# Patient Record
Sex: FEMALE | Race: WHITE | NOT HISPANIC OR LATINO | Employment: FULL TIME | URBAN - METROPOLITAN AREA
[De-identification: names, ages, dates, MRNs, and addresses within clinical notes are randomized per-mention and may not be internally consistent; named-entity substitution may affect disease eponyms.]

---
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
## 2021-03-26 ENCOUNTER — HOSPITAL ENCOUNTER (EMERGENCY)
Facility: HOSPITAL | Age: 21
Discharge: HOME | End: 2021-03-26
Attending: EMERGENCY MEDICINE
Payer: COMMERCIAL

## 2021-03-26 VITALS
HEART RATE: 72 BPM | DIASTOLIC BLOOD PRESSURE: 75 MMHG | BODY MASS INDEX: 44.41 KG/M2 | OXYGEN SATURATION: 99 % | SYSTOLIC BLOOD PRESSURE: 152 MMHG | WEIGHT: 293 LBS | RESPIRATION RATE: 18 BRPM | TEMPERATURE: 98 F | HEIGHT: 68 IN

## 2021-03-26 DIAGNOSIS — M25.569 KNEE PAIN: Primary | ICD-10-CM

## 2021-03-26 LAB
B-HCG UR QL: NEGATIVE
CTP QC/QA: YES

## 2021-03-26 PROCEDURE — 96372 THER/PROPH/DIAG INJ SC/IM: CPT

## 2021-03-26 PROCEDURE — 99284 EMERGENCY DEPT VISIT MOD MDM: CPT | Mod: 25

## 2021-03-26 PROCEDURE — 63600175 PHARM REV CODE 636 W HCPCS: Performed by: PHYSICIAN ASSISTANT

## 2021-03-26 PROCEDURE — 81025 URINE PREGNANCY TEST: CPT | Performed by: PHYSICIAN ASSISTANT

## 2021-03-26 PROCEDURE — 25000003 PHARM REV CODE 250: Performed by: PHYSICIAN ASSISTANT

## 2021-03-26 RX ORDER — DEXAMETHASONE SODIUM PHOSPHATE 4 MG/ML
8 INJECTION, SOLUTION INTRA-ARTICULAR; INTRALESIONAL; INTRAMUSCULAR; INTRAVENOUS; SOFT TISSUE
Status: COMPLETED | OUTPATIENT
Start: 2021-03-26 | End: 2021-03-26

## 2021-03-26 RX ORDER — KETOROLAC TROMETHAMINE 10 MG/1
10 TABLET, FILM COATED ORAL
Status: COMPLETED | OUTPATIENT
Start: 2021-03-26 | End: 2021-03-26

## 2021-03-26 RX ORDER — NAPROXEN 500 MG/1
500 TABLET ORAL 2 TIMES DAILY WITH MEALS
Qty: 12 TABLET | Refills: 0 | Status: SHIPPED | OUTPATIENT
Start: 2021-03-26 | End: 2021-05-28

## 2021-03-26 RX ADMIN — KETOROLAC TROMETHAMINE 10 MG: 10 TABLET, FILM COATED ORAL at 05:03

## 2021-03-26 RX ADMIN — DEXAMETHASONE SODIUM PHOSPHATE 8 MG: 4 INJECTION, SOLUTION INTRA-ARTICULAR; INTRALESIONAL; INTRAMUSCULAR; INTRAVENOUS; SOFT TISSUE at 05:03

## 2021-05-16 ENCOUNTER — HOSPITAL ENCOUNTER (EMERGENCY)
Facility: HOSPITAL | Age: 21
Discharge: HOME | End: 2021-05-16
Attending: EMERGENCY MEDICINE
Payer: COMMERCIAL

## 2021-05-16 VITALS
OXYGEN SATURATION: 100 % | HEART RATE: 102 BPM | RESPIRATION RATE: 18 BRPM | TEMPERATURE: 98 F | SYSTOLIC BLOOD PRESSURE: 147 MMHG | WEIGHT: 293 LBS | BODY MASS INDEX: 44.41 KG/M2 | HEIGHT: 68 IN | DIASTOLIC BLOOD PRESSURE: 71 MMHG

## 2021-05-16 DIAGNOSIS — R10.9 ABDOMINAL PAIN, UNSPECIFIED ABDOMINAL LOCATION: Primary | ICD-10-CM

## 2021-05-16 LAB
B-HCG UR QL: NEGATIVE
CTP QC/QA: YES

## 2021-05-16 PROCEDURE — 99282 PR EMERGENCY DEPT VISIT,LEVEL II: ICD-10-PCS | Mod: ,,, | Performed by: EMERGENCY MEDICINE

## 2021-05-16 PROCEDURE — 81025 URINE PREGNANCY TEST: CPT | Performed by: EMERGENCY MEDICINE

## 2021-05-16 PROCEDURE — 99282 EMERGENCY DEPT VISIT SF MDM: CPT

## 2021-05-16 PROCEDURE — 99282 EMERGENCY DEPT VISIT SF MDM: CPT | Mod: ,,, | Performed by: EMERGENCY MEDICINE

## 2021-05-27 PROBLEM — R41.89 IMPAIRED COGNITION: Status: ACTIVE | Noted: 2021-05-27

## 2021-05-27 PROBLEM — F41.9 ANXIETY DISORDER: Status: ACTIVE | Noted: 2021-05-27

## 2021-05-27 PROBLEM — F32.A DEPRESSIVE DISORDER: Status: ACTIVE | Noted: 2021-05-27

## 2021-05-27 RX ORDER — BUPROPION HYDROCHLORIDE 150 MG/1
150 TABLET, EXTENDED RELEASE ORAL DAILY
COMMUNITY
Start: 2020-12-04 | End: 2021-05-28

## 2021-05-28 ENCOUNTER — OFFICE VISIT (OUTPATIENT)
Dept: INTERNAL MEDICINE | Facility: CLINIC | Age: 21
End: 2021-05-28
Payer: COMMERCIAL

## 2021-05-28 VITALS
BODY MASS INDEX: 44.41 KG/M2 | HEIGHT: 68 IN | SYSTOLIC BLOOD PRESSURE: 142 MMHG | HEART RATE: 78 BPM | WEIGHT: 293 LBS | OXYGEN SATURATION: 98 % | DIASTOLIC BLOOD PRESSURE: 86 MMHG

## 2021-05-28 DIAGNOSIS — F25.9 SCHIZOAFFECTIVE DISORDER, UNSPECIFIED TYPE: ICD-10-CM

## 2021-05-28 DIAGNOSIS — F41.9 ANXIETY DISORDER, UNSPECIFIED TYPE: ICD-10-CM

## 2021-05-28 DIAGNOSIS — E28.2 PCOS (POLYCYSTIC OVARIAN SYNDROME): ICD-10-CM

## 2021-05-28 DIAGNOSIS — Z13.220 SCREENING CHOLESTEROL LEVEL: ICD-10-CM

## 2021-05-28 DIAGNOSIS — Z76.89 ENCOUNTER TO ESTABLISH CARE WITH NEW DOCTOR: ICD-10-CM

## 2021-05-28 DIAGNOSIS — Z01.89 ROUTINE LAB DRAW: ICD-10-CM

## 2021-05-28 DIAGNOSIS — Z11.59 ENCOUNTER FOR HEPATITIS C SCREENING TEST FOR LOW RISK PATIENT: ICD-10-CM

## 2021-05-28 DIAGNOSIS — Z00.00 ENCOUNTER FOR HEALTH MAINTENANCE EXAMINATION: Primary | ICD-10-CM

## 2021-05-28 DIAGNOSIS — F32.A DEPRESSIVE DISORDER: ICD-10-CM

## 2021-05-28 DIAGNOSIS — E66.01 MORBIDLY OBESE: ICD-10-CM

## 2021-05-28 DIAGNOSIS — Z11.4 ENCOUNTER FOR SCREENING FOR HIV: ICD-10-CM

## 2021-05-28 PROCEDURE — 1126F AMNT PAIN NOTED NONE PRSNT: CPT | Mod: S$GLB,,, | Performed by: NURSE PRACTITIONER

## 2021-05-28 PROCEDURE — 3008F PR BODY MASS INDEX (BMI) DOCUMENTED: ICD-10-PCS | Mod: CPTII,S$GLB,, | Performed by: NURSE PRACTITIONER

## 2021-05-28 PROCEDURE — 3008F BODY MASS INDEX DOCD: CPT | Mod: CPTII,S$GLB,, | Performed by: NURSE PRACTITIONER

## 2021-05-28 PROCEDURE — 99999 PR PBB SHADOW E&M-EST. PATIENT-LVL IV: ICD-10-PCS | Mod: PBBFAC,,, | Performed by: NURSE PRACTITIONER

## 2021-05-28 PROCEDURE — 99999 PR PBB SHADOW E&M-EST. PATIENT-LVL IV: CPT | Mod: PBBFAC,,, | Performed by: NURSE PRACTITIONER

## 2021-05-28 PROCEDURE — 99385 PR PREVENTIVE VISIT,NEW,18-39: ICD-10-PCS | Mod: S$GLB,,, | Performed by: NURSE PRACTITIONER

## 2021-05-28 PROCEDURE — 1126F PR PAIN SEVERITY QUANTIFIED, NO PAIN PRESENT: ICD-10-PCS | Mod: S$GLB,,, | Performed by: NURSE PRACTITIONER

## 2021-05-28 PROCEDURE — 99385 PREV VISIT NEW AGE 18-39: CPT | Mod: S$GLB,,, | Performed by: NURSE PRACTITIONER

## 2021-05-29 ENCOUNTER — LAB VISIT (OUTPATIENT)
Dept: LAB | Facility: HOSPITAL | Age: 21
End: 2021-05-29
Payer: COMMERCIAL

## 2021-05-29 DIAGNOSIS — Z11.4 ENCOUNTER FOR SCREENING FOR HIV: ICD-10-CM

## 2021-05-29 DIAGNOSIS — Z11.59 ENCOUNTER FOR HEPATITIS C SCREENING TEST FOR LOW RISK PATIENT: ICD-10-CM

## 2021-05-29 DIAGNOSIS — Z01.89 ROUTINE LAB DRAW: ICD-10-CM

## 2021-05-29 DIAGNOSIS — Z13.220 SCREENING CHOLESTEROL LEVEL: ICD-10-CM

## 2021-05-29 LAB
ALBUMIN SERPL BCP-MCNC: 3.8 G/DL (ref 3.5–5.2)
ALP SERPL-CCNC: 65 U/L (ref 55–135)
ALT SERPL W/O P-5'-P-CCNC: 22 U/L (ref 10–44)
ANION GAP SERPL CALC-SCNC: 9 MMOL/L (ref 8–16)
AST SERPL-CCNC: 15 U/L (ref 10–40)
BASOPHILS # BLD AUTO: 0.04 K/UL (ref 0–0.2)
BASOPHILS NFR BLD: 0.6 % (ref 0–1.9)
BILIRUB SERPL-MCNC: 0.7 MG/DL (ref 0.1–1)
BUN SERPL-MCNC: 9 MG/DL (ref 6–20)
CALCIUM SERPL-MCNC: 9.6 MG/DL (ref 8.7–10.5)
CHLORIDE SERPL-SCNC: 106 MMOL/L (ref 95–110)
CHOLEST SERPL-MCNC: 165 MG/DL (ref 120–199)
CHOLEST/HDLC SERPL: 4.3 {RATIO} (ref 2–5)
CO2 SERPL-SCNC: 25 MMOL/L (ref 23–29)
CREAT SERPL-MCNC: 0.8 MG/DL (ref 0.5–1.4)
DIFFERENTIAL METHOD: ABNORMAL
EOSINOPHIL # BLD AUTO: 0.1 K/UL (ref 0–0.5)
EOSINOPHIL NFR BLD: 1 % (ref 0–8)
ERYTHROCYTE [DISTWIDTH] IN BLOOD BY AUTOMATED COUNT: 13.9 % (ref 11.5–14.5)
EST. GFR  (AFRICAN AMERICAN): >60 ML/MIN/1.73 M^2
EST. GFR  (NON AFRICAN AMERICAN): >60 ML/MIN/1.73 M^2
GLUCOSE SERPL-MCNC: 86 MG/DL (ref 70–110)
HCT VFR BLD AUTO: 40.2 % (ref 37–48.5)
HDLC SERPL-MCNC: 38 MG/DL (ref 40–75)
HDLC SERPL: 23 % (ref 20–50)
HGB BLD-MCNC: 12.4 G/DL (ref 12–16)
IMM GRANULOCYTES # BLD AUTO: 0.02 K/UL (ref 0–0.04)
IMM GRANULOCYTES NFR BLD AUTO: 0.3 % (ref 0–0.5)
LDLC SERPL CALC-MCNC: 104.4 MG/DL (ref 63–159)
LYMPHOCYTES # BLD AUTO: 2.3 K/UL (ref 1–4.8)
LYMPHOCYTES NFR BLD: 36.8 % (ref 18–48)
MCH RBC QN AUTO: 26.2 PG (ref 27–31)
MCHC RBC AUTO-ENTMCNC: 30.8 G/DL (ref 32–36)
MCV RBC AUTO: 85 FL (ref 82–98)
MONOCYTES # BLD AUTO: 0.4 K/UL (ref 0.3–1)
MONOCYTES NFR BLD: 5.9 % (ref 4–15)
NEUTROPHILS # BLD AUTO: 3.5 K/UL (ref 1.8–7.7)
NEUTROPHILS NFR BLD: 55.4 % (ref 38–73)
NONHDLC SERPL-MCNC: 127 MG/DL
NRBC BLD-RTO: 0 /100 WBC
PLATELET # BLD AUTO: 378 K/UL (ref 150–450)
PMV BLD AUTO: 10.7 FL (ref 9.2–12.9)
POTASSIUM SERPL-SCNC: 3.8 MMOL/L (ref 3.5–5.1)
PROT SERPL-MCNC: 7.1 G/DL (ref 6–8.4)
RBC # BLD AUTO: 4.73 M/UL (ref 4–5.4)
SODIUM SERPL-SCNC: 140 MMOL/L (ref 136–145)
TRIGL SERPL-MCNC: 113 MG/DL (ref 30–150)
WBC # BLD AUTO: 6.27 K/UL (ref 3.9–12.7)

## 2021-05-29 PROCEDURE — 86703 HIV-1/HIV-2 1 RESULT ANTBDY: CPT | Performed by: NURSE PRACTITIONER

## 2021-05-29 PROCEDURE — 80053 COMPREHEN METABOLIC PANEL: CPT | Performed by: NURSE PRACTITIONER

## 2021-05-29 PROCEDURE — 85025 COMPLETE CBC W/AUTO DIFF WBC: CPT | Performed by: NURSE PRACTITIONER

## 2021-05-29 PROCEDURE — 80061 LIPID PANEL: CPT | Performed by: NURSE PRACTITIONER

## 2021-05-29 PROCEDURE — 86803 HEPATITIS C AB TEST: CPT | Performed by: NURSE PRACTITIONER

## 2021-05-29 PROCEDURE — 36415 COLL VENOUS BLD VENIPUNCTURE: CPT | Performed by: NURSE PRACTITIONER

## 2021-05-31 LAB
HCV AB SERPL QL IA: NEGATIVE
HIV 1+2 AB+HIV1 P24 AG SERPL QL IA: NEGATIVE

## 2021-06-01 ENCOUNTER — TELEPHONE (OUTPATIENT)
Dept: INTERNAL MEDICINE | Facility: CLINIC | Age: 21
End: 2021-06-01

## 2021-07-13 ENCOUNTER — TELEPHONE (OUTPATIENT)
Dept: INTERNAL MEDICINE | Facility: CLINIC | Age: 21
End: 2021-07-13

## 2021-07-16 ENCOUNTER — OFFICE VISIT (OUTPATIENT)
Dept: INTERNAL MEDICINE | Facility: CLINIC | Age: 21
End: 2021-07-16
Payer: COMMERCIAL

## 2021-07-16 ENCOUNTER — LAB VISIT (OUTPATIENT)
Dept: LAB | Facility: HOSPITAL | Age: 21
End: 2021-07-16
Payer: COMMERCIAL

## 2021-07-16 VITALS
BODY MASS INDEX: 44.41 KG/M2 | DIASTOLIC BLOOD PRESSURE: 86 MMHG | SYSTOLIC BLOOD PRESSURE: 132 MMHG | HEART RATE: 85 BPM | WEIGHT: 293 LBS | HEIGHT: 68 IN

## 2021-07-16 DIAGNOSIS — Z11.3 SCREEN FOR STD (SEXUALLY TRANSMITTED DISEASE): ICD-10-CM

## 2021-07-16 DIAGNOSIS — R21 RASH IN ADULT: Primary | ICD-10-CM

## 2021-07-16 DIAGNOSIS — E66.01 MORBIDLY OBESE: ICD-10-CM

## 2021-07-16 PROCEDURE — 1126F PR PAIN SEVERITY QUANTIFIED, NO PAIN PRESENT: ICD-10-PCS | Mod: S$GLB,,, | Performed by: NURSE PRACTITIONER

## 2021-07-16 PROCEDURE — 36415 COLL VENOUS BLD VENIPUNCTURE: CPT | Performed by: NURSE PRACTITIONER

## 2021-07-16 PROCEDURE — 80074 ACUTE HEPATITIS PANEL: CPT | Performed by: NURSE PRACTITIONER

## 2021-07-16 PROCEDURE — 3008F PR BODY MASS INDEX (BMI) DOCUMENTED: ICD-10-PCS | Mod: CPTII,S$GLB,, | Performed by: NURSE PRACTITIONER

## 2021-07-16 PROCEDURE — 99999 PR PBB SHADOW E&M-EST. PATIENT-LVL III: CPT | Mod: PBBFAC,,, | Performed by: NURSE PRACTITIONER

## 2021-07-16 PROCEDURE — 99214 OFFICE O/P EST MOD 30 MIN: CPT | Mod: S$GLB,,, | Performed by: NURSE PRACTITIONER

## 2021-07-16 PROCEDURE — 1126F AMNT PAIN NOTED NONE PRSNT: CPT | Mod: S$GLB,,, | Performed by: NURSE PRACTITIONER

## 2021-07-16 PROCEDURE — 99214 PR OFFICE/OUTPT VISIT, EST, LEVL IV, 30-39 MIN: ICD-10-PCS | Mod: S$GLB,,, | Performed by: NURSE PRACTITIONER

## 2021-07-16 PROCEDURE — 3008F BODY MASS INDEX DOCD: CPT | Mod: CPTII,S$GLB,, | Performed by: NURSE PRACTITIONER

## 2021-07-16 PROCEDURE — 99999 PR PBB SHADOW E&M-EST. PATIENT-LVL III: ICD-10-PCS | Mod: PBBFAC,,, | Performed by: NURSE PRACTITIONER

## 2021-07-16 RX ORDER — NAPROXEN 500 MG/1
TABLET ORAL
COMMUNITY
Start: 2021-06-24 | End: 2022-03-02

## 2021-07-19 ENCOUNTER — TELEPHONE (OUTPATIENT)
Dept: GASTROENTEROLOGY | Facility: CLINIC | Age: 21
End: 2021-07-19

## 2021-07-19 LAB
HAV IGM SERPL QL IA: NEGATIVE
HBV CORE IGM SERPL QL IA: NEGATIVE
HBV SURFACE AG SERPL QL IA: NEGATIVE
HCV AB SERPL QL IA: NEGATIVE
HCV AB SERPL QL IA: NEGATIVE

## 2021-07-20 ENCOUNTER — PATIENT MESSAGE (OUTPATIENT)
Dept: ORTHOPEDICS | Facility: CLINIC | Age: 21
End: 2021-07-20

## 2021-07-26 ENCOUNTER — HOSPITAL ENCOUNTER (OUTPATIENT)
Dept: RADIOLOGY | Facility: HOSPITAL | Age: 21
Discharge: HOME | End: 2021-07-26
Attending: ORTHOPAEDIC SURGERY
Payer: COMMERCIAL

## 2021-07-26 ENCOUNTER — OFFICE VISIT (OUTPATIENT)
Dept: SPORTS MEDICINE | Facility: CLINIC | Age: 21
End: 2021-07-26
Payer: COMMERCIAL

## 2021-07-26 VITALS — HEIGHT: 68 IN | WEIGHT: 293 LBS | BODY MASS INDEX: 44.41 KG/M2

## 2021-07-26 DIAGNOSIS — M25.569 KNEE PAIN: ICD-10-CM

## 2021-07-26 DIAGNOSIS — M25.562 CHRONIC PAIN OF LEFT KNEE: Primary | ICD-10-CM

## 2021-07-26 DIAGNOSIS — G89.29 CHRONIC PAIN OF LEFT KNEE: Primary | ICD-10-CM

## 2021-07-26 PROCEDURE — 99203 OFFICE O/P NEW LOW 30 MIN: CPT | Mod: S$GLB,,, | Performed by: ORTHOPAEDIC SURGERY

## 2021-07-26 PROCEDURE — 99999 PR PBB SHADOW E&M-EST. PATIENT-LVL III: ICD-10-PCS | Mod: PBBFAC,,, | Performed by: ORTHOPAEDIC SURGERY

## 2021-07-26 PROCEDURE — 73564 X-RAY EXAM KNEE 4 OR MORE: CPT | Mod: 26,,, | Performed by: RADIOLOGY

## 2021-07-26 PROCEDURE — 3008F BODY MASS INDEX DOCD: CPT | Mod: CPTII,S$GLB,, | Performed by: ORTHOPAEDIC SURGERY

## 2021-07-26 PROCEDURE — 1160F RVW MEDS BY RX/DR IN RCRD: CPT | Mod: CPTII,S$GLB,, | Performed by: ORTHOPAEDIC SURGERY

## 2021-07-26 PROCEDURE — 1159F MED LIST DOCD IN RCRD: CPT | Mod: CPTII,S$GLB,, | Performed by: ORTHOPAEDIC SURGERY

## 2021-07-26 PROCEDURE — 1125F PR PAIN SEVERITY QUANTIFIED, PAIN PRESENT: ICD-10-PCS | Mod: CPTII,S$GLB,, | Performed by: ORTHOPAEDIC SURGERY

## 2021-07-26 PROCEDURE — 1159F PR MEDICATION LIST DOCUMENTED IN MEDICAL RECORD: ICD-10-PCS | Mod: CPTII,S$GLB,, | Performed by: ORTHOPAEDIC SURGERY

## 2021-07-26 PROCEDURE — 73564 XR KNEE ORTHO BILAT WITH FLEXION: ICD-10-PCS | Mod: 26,,, | Performed by: RADIOLOGY

## 2021-07-26 PROCEDURE — 1160F PR REVIEW ALL MEDS BY PRESCRIBER/CLIN PHARMACIST DOCUMENTED: ICD-10-PCS | Mod: CPTII,S$GLB,, | Performed by: ORTHOPAEDIC SURGERY

## 2021-07-26 PROCEDURE — 3008F PR BODY MASS INDEX (BMI) DOCUMENTED: ICD-10-PCS | Mod: CPTII,S$GLB,, | Performed by: ORTHOPAEDIC SURGERY

## 2021-07-26 PROCEDURE — 1125F AMNT PAIN NOTED PAIN PRSNT: CPT | Mod: CPTII,S$GLB,, | Performed by: ORTHOPAEDIC SURGERY

## 2021-07-26 PROCEDURE — 99999 PR PBB SHADOW E&M-EST. PATIENT-LVL III: CPT | Mod: PBBFAC,,, | Performed by: ORTHOPAEDIC SURGERY

## 2021-07-26 PROCEDURE — 73564 X-RAY EXAM KNEE 4 OR MORE: CPT | Mod: TC,50

## 2021-07-26 PROCEDURE — 99203 PR OFFICE/OUTPT VISIT, NEW, LEVL III, 30-44 MIN: ICD-10-PCS | Mod: S$GLB,,, | Performed by: ORTHOPAEDIC SURGERY

## 2021-07-28 ENCOUNTER — CLINICAL SUPPORT (OUTPATIENT)
Dept: REHABILITATION | Facility: HOSPITAL | Age: 21
End: 2021-07-28
Payer: COMMERCIAL

## 2021-07-28 DIAGNOSIS — M25.562 CHRONIC PAIN OF LEFT KNEE: ICD-10-CM

## 2021-07-28 DIAGNOSIS — G89.29 CHRONIC PAIN OF LEFT KNEE: ICD-10-CM

## 2021-07-28 PROCEDURE — 97110 THERAPEUTIC EXERCISES: CPT

## 2021-07-28 PROCEDURE — 97161 PT EVAL LOW COMPLEX 20 MIN: CPT

## 2021-08-12 ENCOUNTER — OFFICE VISIT (OUTPATIENT)
Dept: OBSTETRICS AND GYNECOLOGY | Facility: CLINIC | Age: 21
End: 2021-08-12
Payer: COMMERCIAL

## 2021-08-12 VITALS — HEIGHT: 68 IN | BODY MASS INDEX: 44.41 KG/M2 | WEIGHT: 293 LBS

## 2021-08-12 DIAGNOSIS — E28.2 PCOS (POLYCYSTIC OVARIAN SYNDROME): ICD-10-CM

## 2021-08-12 DIAGNOSIS — N91.2 AMENORRHEA: ICD-10-CM

## 2021-08-12 LAB
B-HCG UR QL: NEGATIVE
CTP QC/QA: YES

## 2021-08-12 PROCEDURE — 81025 POCT URINE PREGNANCY: ICD-10-PCS | Mod: S$GLB,,, | Performed by: OBSTETRICS & GYNECOLOGY

## 2021-08-12 PROCEDURE — 99999 PR PBB SHADOW E&M-EST. PATIENT-LVL III: CPT | Mod: PBBFAC,,, | Performed by: OBSTETRICS & GYNECOLOGY

## 2021-08-12 PROCEDURE — 1159F PR MEDICATION LIST DOCUMENTED IN MEDICAL RECORD: ICD-10-PCS | Mod: CPTII,S$GLB,, | Performed by: OBSTETRICS & GYNECOLOGY

## 2021-08-12 PROCEDURE — 3008F BODY MASS INDEX DOCD: CPT | Mod: CPTII,S$GLB,, | Performed by: OBSTETRICS & GYNECOLOGY

## 2021-08-12 PROCEDURE — 1160F PR REVIEW ALL MEDS BY PRESCRIBER/CLIN PHARMACIST DOCUMENTED: ICD-10-PCS | Mod: CPTII,S$GLB,, | Performed by: OBSTETRICS & GYNECOLOGY

## 2021-08-12 PROCEDURE — 1125F PR PAIN SEVERITY QUANTIFIED, PAIN PRESENT: ICD-10-PCS | Mod: CPTII,S$GLB,, | Performed by: OBSTETRICS & GYNECOLOGY

## 2021-08-12 PROCEDURE — 99203 PR OFFICE/OUTPT VISIT, NEW, LEVL III, 30-44 MIN: ICD-10-PCS | Mod: 25,S$GLB,, | Performed by: OBSTETRICS & GYNECOLOGY

## 2021-08-12 PROCEDURE — 99999 PR PBB SHADOW E&M-EST. PATIENT-LVL III: ICD-10-PCS | Mod: PBBFAC,,, | Performed by: OBSTETRICS & GYNECOLOGY

## 2021-08-12 PROCEDURE — 81025 URINE PREGNANCY TEST: CPT | Mod: S$GLB,,, | Performed by: OBSTETRICS & GYNECOLOGY

## 2021-08-12 PROCEDURE — 1160F RVW MEDS BY RX/DR IN RCRD: CPT | Mod: CPTII,S$GLB,, | Performed by: OBSTETRICS & GYNECOLOGY

## 2021-08-12 PROCEDURE — 1159F MED LIST DOCD IN RCRD: CPT | Mod: CPTII,S$GLB,, | Performed by: OBSTETRICS & GYNECOLOGY

## 2021-08-12 PROCEDURE — 99203 OFFICE O/P NEW LOW 30 MIN: CPT | Mod: 25,S$GLB,, | Performed by: OBSTETRICS & GYNECOLOGY

## 2021-08-12 PROCEDURE — 1125F AMNT PAIN NOTED PAIN PRSNT: CPT | Mod: CPTII,S$GLB,, | Performed by: OBSTETRICS & GYNECOLOGY

## 2021-08-12 PROCEDURE — 3008F PR BODY MASS INDEX (BMI) DOCUMENTED: ICD-10-PCS | Mod: CPTII,S$GLB,, | Performed by: OBSTETRICS & GYNECOLOGY

## 2021-08-12 RX ORDER — MEDROXYPROGESTERONE ACETATE 10 MG/1
10 TABLET ORAL DAILY
Qty: 14 TABLET | Refills: 11 | Status: SHIPPED | OUTPATIENT
Start: 2021-08-12 | End: 2021-11-24

## 2021-08-18 ENCOUNTER — TELEPHONE (OUTPATIENT)
Dept: ADMINISTRATIVE | Facility: OTHER | Age: 21
End: 2021-08-18

## 2021-10-21 ENCOUNTER — HOSPITAL ENCOUNTER (EMERGENCY)
Facility: HOSPITAL | Age: 21
Discharge: HOME | End: 2021-10-21
Attending: EMERGENCY MEDICINE
Payer: COMMERCIAL

## 2021-10-21 ENCOUNTER — PATIENT OUTREACH (OUTPATIENT)
Dept: EMERGENCY MEDICINE | Facility: HOSPITAL | Age: 21
End: 2021-10-21

## 2021-10-21 VITALS
SYSTOLIC BLOOD PRESSURE: 130 MMHG | WEIGHT: 293 LBS | DIASTOLIC BLOOD PRESSURE: 68 MMHG | HEART RATE: 65 BPM | BODY MASS INDEX: 44.41 KG/M2 | TEMPERATURE: 98 F | RESPIRATION RATE: 16 BRPM | OXYGEN SATURATION: 100 % | HEIGHT: 68 IN

## 2021-10-21 DIAGNOSIS — R11.0 NAUSEA: Primary | ICD-10-CM

## 2021-10-21 LAB
ALBUMIN SERPL BCP-MCNC: 4.2 G/DL (ref 3.5–5.2)
ALP SERPL-CCNC: 68 U/L (ref 55–135)
ALT SERPL W/O P-5'-P-CCNC: 32 U/L (ref 10–44)
ANION GAP SERPL CALC-SCNC: 12 MMOL/L (ref 8–16)
AST SERPL-CCNC: 20 U/L (ref 10–40)
B-HCG UR QL: NEGATIVE
BASOPHILS # BLD AUTO: 0.05 K/UL (ref 0–0.2)
BASOPHILS NFR BLD: 0.7 % (ref 0–1.9)
BILIRUB SERPL-MCNC: 0.6 MG/DL (ref 0.1–1)
BILIRUB UR QL STRIP: NEGATIVE
BUN SERPL-MCNC: 9 MG/DL (ref 6–20)
CALCIUM SERPL-MCNC: 10.1 MG/DL (ref 8.7–10.5)
CHLORIDE SERPL-SCNC: 108 MMOL/L (ref 95–110)
CLARITY UR REFRACT.AUTO: ABNORMAL
CO2 SERPL-SCNC: 22 MMOL/L (ref 23–29)
COLOR UR AUTO: YELLOW
CREAT SERPL-MCNC: 0.7 MG/DL (ref 0.5–1.4)
CTP QC/QA: YES
DIFFERENTIAL METHOD: ABNORMAL
EOSINOPHIL # BLD AUTO: 0 K/UL (ref 0–0.5)
EOSINOPHIL NFR BLD: 0.4 % (ref 0–8)
ERYTHROCYTE [DISTWIDTH] IN BLOOD BY AUTOMATED COUNT: 13.7 % (ref 11.5–14.5)
EST. GFR  (AFRICAN AMERICAN): >60 ML/MIN/1.73 M^2
EST. GFR  (NON AFRICAN AMERICAN): >60 ML/MIN/1.73 M^2
GLUCOSE SERPL-MCNC: 78 MG/DL (ref 70–110)
GLUCOSE UR QL STRIP: NEGATIVE
HCT VFR BLD AUTO: 44 % (ref 37–48.5)
HGB BLD-MCNC: 13.6 G/DL (ref 12–16)
HGB UR QL STRIP: NEGATIVE
IMM GRANULOCYTES # BLD AUTO: 0.01 K/UL (ref 0–0.04)
IMM GRANULOCYTES NFR BLD AUTO: 0.1 % (ref 0–0.5)
KETONES UR QL STRIP: NEGATIVE
LEUKOCYTE ESTERASE UR QL STRIP: ABNORMAL
LYMPHOCYTES # BLD AUTO: 2.5 K/UL (ref 1–4.8)
LYMPHOCYTES NFR BLD: 33.8 % (ref 18–48)
MCH RBC QN AUTO: 27.2 PG (ref 27–31)
MCHC RBC AUTO-ENTMCNC: 30.9 G/DL (ref 32–36)
MCV RBC AUTO: 88 FL (ref 82–98)
MICROSCOPIC COMMENT: NORMAL
MONOCYTES # BLD AUTO: 0.4 K/UL (ref 0.3–1)
MONOCYTES NFR BLD: 5 % (ref 4–15)
NEUTROPHILS # BLD AUTO: 4.4 K/UL (ref 1.8–7.7)
NEUTROPHILS NFR BLD: 60 % (ref 38–73)
NITRITE UR QL STRIP: NEGATIVE
NRBC BLD-RTO: 0 /100 WBC
PH UR STRIP: 6 [PH] (ref 5–8)
PLATELET # BLD AUTO: 372 K/UL (ref 150–450)
PMV BLD AUTO: 10.5 FL (ref 9.2–12.9)
POTASSIUM SERPL-SCNC: 3.9 MMOL/L (ref 3.5–5.1)
PROT SERPL-MCNC: 7.8 G/DL (ref 6–8.4)
PROT UR QL STRIP: NEGATIVE
RBC # BLD AUTO: 5 M/UL (ref 4–5.4)
SODIUM SERPL-SCNC: 142 MMOL/L (ref 136–145)
SP GR UR STRIP: 1.03 (ref 1–1.03)
SQUAMOUS #/AREA URNS AUTO: 7 /HPF
URN SPEC COLLECT METH UR: ABNORMAL
WBC # BLD AUTO: 7.36 K/UL (ref 3.9–12.7)
WBC #/AREA URNS AUTO: 2 /HPF (ref 0–5)

## 2021-10-21 PROCEDURE — 80053 COMPREHEN METABOLIC PANEL: CPT | Performed by: EMERGENCY MEDICINE

## 2021-10-21 PROCEDURE — 85025 COMPLETE CBC W/AUTO DIFF WBC: CPT | Performed by: EMERGENCY MEDICINE

## 2021-10-21 PROCEDURE — 99284 EMERGENCY DEPT VISIT MOD MDM: CPT | Mod: 25

## 2021-10-21 PROCEDURE — 96360 HYDRATION IV INFUSION INIT: CPT

## 2021-10-21 PROCEDURE — 99284 PR EMERGENCY DEPT VISIT,LEVEL IV: ICD-10-PCS | Mod: ,,, | Performed by: EMERGENCY MEDICINE

## 2021-10-21 PROCEDURE — 25000003 PHARM REV CODE 250: Performed by: EMERGENCY MEDICINE

## 2021-10-21 PROCEDURE — 81001 URINALYSIS AUTO W/SCOPE: CPT | Performed by: EMERGENCY MEDICINE

## 2021-10-21 PROCEDURE — 81025 URINE PREGNANCY TEST: CPT | Performed by: EMERGENCY MEDICINE

## 2021-10-21 PROCEDURE — 99284 EMERGENCY DEPT VISIT MOD MDM: CPT | Mod: ,,, | Performed by: EMERGENCY MEDICINE

## 2021-10-21 RX ORDER — SODIUM CHLORIDE 9 MG/ML
INJECTION, SOLUTION INTRAVENOUS
Status: COMPLETED | OUTPATIENT
Start: 2021-10-21 | End: 2021-10-21

## 2021-10-21 RX ORDER — TETRACAINE HYDROCHLORIDE 5 MG/ML
2 SOLUTION OPHTHALMIC
Status: COMPLETED | OUTPATIENT
Start: 2021-10-21 | End: 2021-10-21

## 2021-10-21 RX ORDER — IBUPROFEN 600 MG/1
600 TABLET ORAL
Status: COMPLETED | OUTPATIENT
Start: 2021-10-21 | End: 2021-10-21

## 2021-10-21 RX ORDER — ACETAMINOPHEN 500 MG
1000 TABLET ORAL
Status: COMPLETED | OUTPATIENT
Start: 2021-10-21 | End: 2021-10-21

## 2021-10-21 RX ORDER — ONDANSETRON 4 MG/1
4 TABLET, FILM COATED ORAL EVERY 8 HOURS PRN
Qty: 9 TABLET | Refills: 0 | Status: SHIPPED | OUTPATIENT
Start: 2021-10-21 | End: 2021-10-24

## 2021-10-21 RX ADMIN — IBUPROFEN 600 MG: 600 TABLET, FILM COATED ORAL at 04:10

## 2021-10-21 RX ADMIN — TETRACAINE HYDROCHLORIDE 2 DROP: 5 SOLUTION OPHTHALMIC at 04:10

## 2021-10-21 RX ADMIN — ACETAMINOPHEN 1000 MG: 500 TABLET ORAL at 04:10

## 2021-10-21 RX ADMIN — SODIUM CHLORIDE: 0.9 INJECTION, SOLUTION INTRAVENOUS at 04:10

## 2021-11-03 ENCOUNTER — OFFICE VISIT (OUTPATIENT)
Dept: BARIATRICS | Facility: CLINIC | Age: 21
End: 2021-11-03
Payer: COMMERCIAL

## 2021-11-03 ENCOUNTER — TELEPHONE (OUTPATIENT)
Dept: BARIATRICS | Facility: CLINIC | Age: 21
End: 2021-11-03
Payer: COMMERCIAL

## 2021-11-03 ENCOUNTER — CLINICAL SUPPORT (OUTPATIENT)
Dept: BARIATRICS | Facility: CLINIC | Age: 21
End: 2021-11-03
Payer: COMMERCIAL

## 2021-11-03 VITALS
HEIGHT: 67 IN | WEIGHT: 293 LBS | HEART RATE: 88 BPM | BODY MASS INDEX: 45.99 KG/M2 | SYSTOLIC BLOOD PRESSURE: 132 MMHG | DIASTOLIC BLOOD PRESSURE: 84 MMHG | OXYGEN SATURATION: 98 %

## 2021-11-03 DIAGNOSIS — E28.2 PCOS (POLYCYSTIC OVARIAN SYNDROME): ICD-10-CM

## 2021-11-03 DIAGNOSIS — Z71.3 DIETARY COUNSELING AND SURVEILLANCE: Primary | ICD-10-CM

## 2021-11-03 DIAGNOSIS — E66.01 MORBID OBESITY WITH BODY MASS INDEX OF 50.0-59.9 IN ADULT: ICD-10-CM

## 2021-11-03 DIAGNOSIS — F32.A DEPRESSIVE DISORDER: Primary | ICD-10-CM

## 2021-11-03 DIAGNOSIS — E66.01 CLASS 3 SEVERE OBESITY DUE TO EXCESS CALORIES WITHOUT SERIOUS COMORBIDITY WITH BODY MASS INDEX (BMI) OF 50.0 TO 59.9 IN ADULT: ICD-10-CM

## 2021-11-03 PROCEDURE — 99205 OFFICE O/P NEW HI 60 MIN: CPT | Mod: S$GLB,,, | Performed by: PHYSICIAN ASSISTANT

## 2021-11-03 PROCEDURE — 3079F DIAST BP 80-89 MM HG: CPT | Mod: CPTII,S$GLB,, | Performed by: PHYSICIAN ASSISTANT

## 2021-11-03 PROCEDURE — 3075F SYST BP GE 130 - 139MM HG: CPT | Mod: CPTII,S$GLB,, | Performed by: PHYSICIAN ASSISTANT

## 2021-11-03 PROCEDURE — 99999 PR PBB SHADOW E&M-EST. PATIENT-LVL II: CPT | Mod: PBBFAC,,, | Performed by: DIETITIAN, REGISTERED

## 2021-11-03 PROCEDURE — 99999 PR PBB SHADOW E&M-EST. PATIENT-LVL II: ICD-10-PCS | Mod: PBBFAC,,, | Performed by: DIETITIAN, REGISTERED

## 2021-11-03 PROCEDURE — 1159F PR MEDICATION LIST DOCUMENTED IN MEDICAL RECORD: ICD-10-PCS | Mod: CPTII,S$GLB,, | Performed by: PHYSICIAN ASSISTANT

## 2021-11-03 PROCEDURE — 3008F BODY MASS INDEX DOCD: CPT | Mod: CPTII,S$GLB,, | Performed by: PHYSICIAN ASSISTANT

## 2021-11-03 PROCEDURE — 3008F PR BODY MASS INDEX (BMI) DOCUMENTED: ICD-10-PCS | Mod: CPTII,S$GLB,, | Performed by: PHYSICIAN ASSISTANT

## 2021-11-03 PROCEDURE — 99999 PR PBB SHADOW E&M-EST. PATIENT-LVL III: ICD-10-PCS | Mod: PBBFAC,,, | Performed by: PHYSICIAN ASSISTANT

## 2021-11-03 PROCEDURE — 99999 PR PBB SHADOW E&M-EST. PATIENT-LVL III: CPT | Mod: PBBFAC,,, | Performed by: PHYSICIAN ASSISTANT

## 2021-11-03 PROCEDURE — 99499 UNLISTED E&M SERVICE: CPT | Mod: S$GLB,,, | Performed by: DIETITIAN, REGISTERED

## 2021-11-03 PROCEDURE — 1159F MED LIST DOCD IN RCRD: CPT | Mod: CPTII,S$GLB,, | Performed by: PHYSICIAN ASSISTANT

## 2021-11-03 PROCEDURE — 99499 NO LOS: ICD-10-PCS | Mod: S$GLB,,, | Performed by: DIETITIAN, REGISTERED

## 2021-11-03 PROCEDURE — 3075F PR MOST RECENT SYSTOLIC BLOOD PRESS GE 130-139MM HG: ICD-10-PCS | Mod: CPTII,S$GLB,, | Performed by: PHYSICIAN ASSISTANT

## 2021-11-03 PROCEDURE — 3079F PR MOST RECENT DIASTOLIC BLOOD PRESSURE 80-89 MM HG: ICD-10-PCS | Mod: CPTII,S$GLB,, | Performed by: PHYSICIAN ASSISTANT

## 2021-11-03 PROCEDURE — 1160F PR REVIEW ALL MEDS BY PRESCRIBER/CLIN PHARMACIST DOCUMENTED: ICD-10-PCS | Mod: CPTII,S$GLB,, | Performed by: PHYSICIAN ASSISTANT

## 2021-11-03 PROCEDURE — 1160F RVW MEDS BY RX/DR IN RCRD: CPT | Mod: CPTII,S$GLB,, | Performed by: PHYSICIAN ASSISTANT

## 2021-11-03 PROCEDURE — 99205 PR OFFICE/OUTPT VISIT, NEW, LEVL V, 60-74 MIN: ICD-10-PCS | Mod: S$GLB,,, | Performed by: PHYSICIAN ASSISTANT

## 2021-11-05 VITALS — BODY MASS INDEX: 45.99 KG/M2 | HEIGHT: 67 IN | WEIGHT: 293 LBS

## 2021-11-10 ENCOUNTER — HOSPITAL ENCOUNTER (OUTPATIENT)
Dept: RADIOLOGY | Facility: HOSPITAL | Age: 21
Discharge: HOME | End: 2021-11-10
Attending: PHYSICIAN ASSISTANT
Payer: COMMERCIAL

## 2021-11-10 DIAGNOSIS — E28.2 PCOS (POLYCYSTIC OVARIAN SYNDROME): ICD-10-CM

## 2021-11-10 DIAGNOSIS — E66.01 CLASS 3 SEVERE OBESITY DUE TO EXCESS CALORIES WITHOUT SERIOUS COMORBIDITY WITH BODY MASS INDEX (BMI) OF 50.0 TO 59.9 IN ADULT: ICD-10-CM

## 2021-11-10 PROCEDURE — 71046 XR CHEST PA AND LATERAL: ICD-10-PCS | Mod: 26,,, | Performed by: RADIOLOGY

## 2021-11-10 PROCEDURE — 71046 X-RAY EXAM CHEST 2 VIEWS: CPT | Mod: 26,,, | Performed by: RADIOLOGY

## 2021-11-10 PROCEDURE — 71046 X-RAY EXAM CHEST 2 VIEWS: CPT | Mod: TC,FY

## 2021-11-11 DIAGNOSIS — R79.89 LOW VITAMIN D LEVEL: Primary | ICD-10-CM

## 2021-11-11 RX ORDER — ERGOCALCIFEROL 1.25 MG/1
50000 CAPSULE ORAL
Qty: 12 CAPSULE | Refills: 0 | Status: SHIPPED | OUTPATIENT
Start: 2021-11-11 | End: 2022-01-28

## 2021-11-24 ENCOUNTER — OFFICE VISIT (OUTPATIENT)
Dept: OBSTETRICS AND GYNECOLOGY | Facility: CLINIC | Age: 21
End: 2021-11-24
Payer: COMMERCIAL

## 2021-11-24 VITALS
HEIGHT: 67 IN | BODY MASS INDEX: 45.99 KG/M2 | DIASTOLIC BLOOD PRESSURE: 76 MMHG | SYSTOLIC BLOOD PRESSURE: 128 MMHG | WEIGHT: 293 LBS

## 2021-11-24 DIAGNOSIS — Z30.09 ENCOUNTER FOR COUNSELING REGARDING CONTRACEPTION: ICD-10-CM

## 2021-11-24 DIAGNOSIS — Z01.419 WELL WOMAN EXAM WITH ROUTINE GYNECOLOGICAL EXAM: Primary | ICD-10-CM

## 2021-11-24 DIAGNOSIS — Z97.5 CONTRACEPTION, DEVICE INTRAUTERINE: ICD-10-CM

## 2021-11-24 LAB
B-HCG UR QL: NEGATIVE
CTP QC/QA: YES

## 2021-11-24 PROCEDURE — 99395 PR PREVENTIVE VISIT,EST,18-39: ICD-10-PCS | Mod: 25,S$GLB,, | Performed by: OBSTETRICS & GYNECOLOGY

## 2021-11-24 PROCEDURE — 88141 PR  CYTOPATH CERV/VAG INTERPRET: ICD-10-PCS | Mod: ,,, | Performed by: PATHOLOGY

## 2021-11-24 PROCEDURE — 88141 CYTOPATH C/V INTERPRET: CPT | Mod: ,,, | Performed by: PATHOLOGY

## 2021-11-24 PROCEDURE — 81025 URINE PREGNANCY TEST: CPT | Mod: S$GLB,,, | Performed by: OBSTETRICS & GYNECOLOGY

## 2021-11-24 PROCEDURE — 99999 PR PBB SHADOW E&M-EST. PATIENT-LVL II: ICD-10-PCS | Mod: PBBFAC,,, | Performed by: OBSTETRICS & GYNECOLOGY

## 2021-11-24 PROCEDURE — 81025 POCT URINE PREGNANCY: ICD-10-PCS | Mod: S$GLB,,, | Performed by: OBSTETRICS & GYNECOLOGY

## 2021-11-24 PROCEDURE — 99395 PREV VISIT EST AGE 18-39: CPT | Mod: 25,S$GLB,, | Performed by: OBSTETRICS & GYNECOLOGY

## 2021-11-24 PROCEDURE — 99999 PR PBB SHADOW E&M-EST. PATIENT-LVL II: CPT | Mod: PBBFAC,,, | Performed by: OBSTETRICS & GYNECOLOGY

## 2021-11-24 PROCEDURE — 88175 CYTOPATH C/V AUTO FLUID REDO: CPT | Performed by: PATHOLOGY

## 2021-11-24 RX ORDER — ETONOGESTREL AND ETHINYL ESTRADIOL VAGINAL RING .015; .12 MG/D; MG/D
1 RING VAGINAL
Qty: 1 EACH | Refills: 1 | Status: SHIPPED | OUTPATIENT
Start: 2021-11-24 | End: 2023-08-29

## 2021-12-06 LAB
FINAL PATHOLOGIC DIAGNOSIS: NORMAL
Lab: NORMAL

## 2021-12-08 ENCOUNTER — OFFICE VISIT (OUTPATIENT)
Dept: GASTROENTEROLOGY | Facility: CLINIC | Age: 21
End: 2021-12-08
Payer: COMMERCIAL

## 2021-12-08 ENCOUNTER — CLINICAL SUPPORT (OUTPATIENT)
Dept: BARIATRICS | Facility: CLINIC | Age: 21
End: 2021-12-08
Payer: COMMERCIAL

## 2021-12-08 VITALS
BODY MASS INDEX: 45.99 KG/M2 | HEIGHT: 67 IN | HEART RATE: 75 BPM | DIASTOLIC BLOOD PRESSURE: 98 MMHG | WEIGHT: 293 LBS | SYSTOLIC BLOOD PRESSURE: 154 MMHG

## 2021-12-08 VITALS — BODY MASS INDEX: 53.52 KG/M2 | WEIGHT: 293 LBS

## 2021-12-08 DIAGNOSIS — Z71.3 DIETARY COUNSELING AND SURVEILLANCE: ICD-10-CM

## 2021-12-08 DIAGNOSIS — E66.01 MORBID OBESITY WITH BODY MASS INDEX (BMI) OF 50.0 TO 59.9 IN ADULT: ICD-10-CM

## 2021-12-08 DIAGNOSIS — R11.0 NAUSEA: ICD-10-CM

## 2021-12-08 PROCEDURE — 99999 PR PBB SHADOW E&M-EST. PATIENT-LVL III: ICD-10-PCS | Mod: PBBFAC,,, | Performed by: INTERNAL MEDICINE

## 2021-12-08 PROCEDURE — 97803 MED NUTRITION INDIV SUBSEQ: CPT | Mod: S$GLB,,, | Performed by: DIETITIAN, REGISTERED

## 2021-12-08 PROCEDURE — 99999 PR PBB SHADOW E&M-EST. PATIENT-LVL I: CPT | Mod: PBBFAC,,, | Performed by: DIETITIAN, REGISTERED

## 2021-12-08 PROCEDURE — 99999 PR PBB SHADOW E&M-EST. PATIENT-LVL III: CPT | Mod: PBBFAC,,, | Performed by: INTERNAL MEDICINE

## 2021-12-08 PROCEDURE — 97803 PR MED NUTR THER, SUBSQ, INDIV, EA 15 MIN: ICD-10-PCS | Mod: S$GLB,,, | Performed by: DIETITIAN, REGISTERED

## 2021-12-08 PROCEDURE — 99499 UNLISTED E&M SERVICE: CPT | Mod: S$GLB,,, | Performed by: DIETITIAN, REGISTERED

## 2021-12-08 PROCEDURE — 99204 OFFICE O/P NEW MOD 45 MIN: CPT | Mod: S$GLB,,, | Performed by: INTERNAL MEDICINE

## 2021-12-08 PROCEDURE — 99499 NO LOS: ICD-10-PCS | Mod: S$GLB,,, | Performed by: DIETITIAN, REGISTERED

## 2021-12-08 PROCEDURE — 99999 PR PBB SHADOW E&M-EST. PATIENT-LVL I: ICD-10-PCS | Mod: PBBFAC,,, | Performed by: DIETITIAN, REGISTERED

## 2021-12-08 PROCEDURE — 99204 PR OFFICE/OUTPT VISIT, NEW, LEVL IV, 45-59 MIN: ICD-10-PCS | Mod: S$GLB,,, | Performed by: INTERNAL MEDICINE

## 2021-12-26 ENCOUNTER — HOSPITAL ENCOUNTER (EMERGENCY)
Facility: HOSPITAL | Age: 21
Discharge: HOME | End: 2021-12-26
Attending: EMERGENCY MEDICINE
Payer: COMMERCIAL

## 2021-12-26 ENCOUNTER — PATIENT MESSAGE (OUTPATIENT)
Dept: ADMINISTRATIVE | Facility: OTHER | Age: 21
End: 2021-12-26
Payer: COMMERCIAL

## 2021-12-26 VITALS
DIASTOLIC BLOOD PRESSURE: 117 MMHG | SYSTOLIC BLOOD PRESSURE: 160 MMHG | TEMPERATURE: 100 F | RESPIRATION RATE: 18 BRPM | HEIGHT: 68 IN | OXYGEN SATURATION: 96 % | WEIGHT: 293 LBS | HEART RATE: 95 BPM | BODY MASS INDEX: 44.41 KG/M2

## 2021-12-26 DIAGNOSIS — Z20.822 COVID-19 VIRUS NOT DETECTED: Primary | ICD-10-CM

## 2021-12-26 DIAGNOSIS — J06.9 VIRAL URI WITH COUGH: ICD-10-CM

## 2021-12-26 LAB
CTP QC/QA: YES
SARS-COV-2 RDRP RESP QL NAA+PROBE: NEGATIVE

## 2021-12-26 PROCEDURE — 99284 PR EMERGENCY DEPT VISIT,LEVEL IV: ICD-10-PCS | Mod: CR,CS,, | Performed by: EMERGENCY MEDICINE

## 2021-12-26 PROCEDURE — 99284 EMERGENCY DEPT VISIT MOD MDM: CPT | Mod: CR,CS,, | Performed by: EMERGENCY MEDICINE

## 2021-12-26 PROCEDURE — U0002 COVID-19 LAB TEST NON-CDC: HCPCS | Performed by: EMERGENCY MEDICINE

## 2021-12-26 PROCEDURE — 99282 EMERGENCY DEPT VISIT SF MDM: CPT | Mod: 25

## 2021-12-26 NOTE — Clinical Note
"Brittaney Esposito (Kassie) was seen and treated in our emergency department on 12/26/2021.     COVID-19 is present in our communities across the state. There is limited testing for COVID at this time, so not all patients can be tested. In this situation, your employee meets the following criteria:    Brittaney Esposito has met the criteria for COVID-19 testing and has a NEGATIVE result. The employee can return to work once they are asymptomatic for 72 hours without the use of fever reducing medications (Tylenol, Motrin, etc).     If you have any questions or concerns, or if I can be of further assistance, please do not hesitate to contact me.    Sincerely,             Anai Calabrese MD"

## 2021-12-27 NOTE — DISCHARGE INSTRUCTIONS
Your rapid covid was negative today.  Follow up with your doctor for blood pressure check  Return to ED for fevers, shortness of breath, chest pain or nay other concerns

## 2021-12-27 NOTE — ED PROVIDER NOTES
Encounter Date: 12/26/2021       History     Chief Complaint   Patient presents with    COVID-19 Concerns     Pt c/o sore throat, CP, HA, loss of taste/smell, congestion and earaches x 1 wk.      22 y/o F with history of PCOS, ADHD, depression, anxiety and asthma as a child presents with covid concerns.  Pt notes sore throat and mild headache about 5 days ago.   Increasing congestion and bilateral earaches a couple days later. Notes fluid coming out of rt ear as well.   Loss of taste and smell 2 days ago   Also notes some tightness in her chest  No SOB  No fevers at home  No known covid exposures    Vaccinated against covid- no booster    Pt does note that she has a lot of allergies to grass and pollen        Review of patient's allergies indicates:   Allergen Reactions    Grass pollen-june grass standard      Past Medical History:   Diagnosis Date    ADHD (attention deficit hyperactivity disorder)     Anxiety     Asthma     as a child    Bronchitis     Depression     Hypersomnia     PCOS (polycystic ovarian syndrome)     Runner's knee, left     long time since she saw someone for this    Uterine cyst     Uterine fibroid      Past Surgical History:   Procedure Laterality Date    ADENOIDECTOMY      TONSILLECTOMY       Family History   Problem Relation Age of Onset    Hypertension Mother     Dislocations Mother     Diabetes Mother     Obesity Mother     Depression Mother     Cancer Maternal Grandmother         Skin, lung, and breast    Diabetes Maternal Grandmother     Dislocations Maternal Grandmother     Breast cancer Maternal Grandmother     Obesity Maternal Grandmother     Skin cancer Maternal Grandmother     Hypertension Father     Heart disease Father     Skin cancer Father     Endometriosis Sister     Heart disease Paternal Grandfather     Colon cancer Neg Hx     Ovarian cancer Neg Hx     Esophageal cancer Neg Hx      Social History     Tobacco Use    Smoking status: Current  Every Day Smoker     Packs/day: 0.50     Years: 4.00     Pack years: 2.00     Types: Cigarettes    Smokeless tobacco: Never Used   Substance Use Topics    Alcohol use: No    Drug use: Yes     Types: Marijuana     Review of Systems   Constitutional: Negative for fever.   HENT: Positive for congestion, ear discharge, ear pain, sinus pain and sore throat.    Eyes: Negative for visual disturbance.   Respiratory: Positive for cough and chest tightness.    Cardiovascular: Negative for palpitations and leg swelling.   Gastrointestinal: Negative for nausea and vomiting.   Musculoskeletal: Negative for myalgias.   Skin: Negative for rash.   Neurological: Positive for headaches.       Physical Exam     Initial Vitals [12/26/21 2152]   BP Pulse Resp Temp SpO2   (!) 160/117 95 18 99.5 °F (37.5 °C) 98 %      MAP       --         Physical Exam    Nursing note and vitals reviewed.  Constitutional: She appears well-developed and well-nourished. She is not diaphoretic. No distress.   HENT:   Head: Normocephalic and atraumatic.   Right Ear: External ear normal.   Left Ear: External ear normal.   Mouth/Throat: Oropharynx is clear and moist. No oropharyngeal exudate.   miminal clear fluid behind bilateral LT Rt>Lt, erythema or bulging   Eyes: Conjunctivae are normal.   Neck: Neck supple.   Cardiovascular: Normal rate.   Pulmonary/Chest: Breath sounds normal. No respiratory distress. She has no wheezes. She has no rhonchi. She has no rales.   Abdominal: She exhibits no distension.   Musculoskeletal:         General: No edema.      Cervical back: Neck supple.     Lymphadenopathy:     She has no cervical adenopathy.   Neurological: She is alert and oriented to person, place, and time. GCS score is 15. GCS eye subscore is 4. GCS verbal subscore is 5. GCS motor subscore is 6.   Skin: Skin is warm and dry.         ED Course   Procedures  Labs Reviewed   SARS-COV-2 RDRP GENE - Normal    Narrative:     This test utilizes isothermal nucleic  acid amplification   technology to detect the SARS-CoV-2 RdRp nucleic acid segment.   The analytical sensitivity (limit of detection) is 125 genome   equivalents/mL.   A POSITIVE result implies infection with the SARS-CoV-2 virus;   the patient is presumed to be contagious.     A NEGATIVE result means that SARS-CoV-2 nucleic acids are not   present above the limit of detection. A NEGATIVE result should be   treated as presumptive. It does not rule out the possibility of   COVID-19 and should not be the sole basis for treatment decisions.   If COVID-19 is strongly suspected based on clinical and exposure   history, re-testing using an alternate molecular assay should be   considered.   This test is only for use under the Food and Drug   Administration s Emergency Use Authorization (EUA).   Commercial kits are provided by [employer].   Performance characteristics of the EUA have been independently   verified by [facility] Department of   Pathology and Laboratory Medicine.   _________________________________________________________________   The authorized Fact Sheet for Healthcare Providers and the authorized Fact   Sheet for Patients of the ID NOW COVID-19 are available on the FDA   website:     https://www.fda.gov/media/302413/download  https://www.fda.gov/media/314660/download                Imaging Results    None          Medications - No data to display     Medical Decision Making:   History:   Old Medical Records: I decided to obtain old medical records.  Initial Assessment:   20 y/o F with congestion, sore throat, loss of sense of taste and smell as well as earaches  No e/o of AOM or otitis externa  No e/o pharyngitis  Ddx: covid, other viral URI  poc covid  Clinical Tests:   Lab Tests: Ordered and Reviewed  ED Management:  Poc covid negative. CDC isolation recommendations provided. Routine return precautions.                      Clinical Impression:   Final diagnoses:  [Z20.822] COVID-19  virus not detected (Primary)          ED Disposition Condition    Discharge Stable        ED Prescriptions     None        Follow-up Information     Follow up With Specialties Details Why Contact Info Additional Information    [facility] Schedule an appointment as soon as possible for a visit   [address]  [facility] [phone] [facility] Please park in surface lot and check in at central registration desk           Anai Calabrese MD  12/30/21 7378

## 2021-12-27 NOTE — ED NOTES
Patient identifiers verified and correct for Brittaney Esposito    Pt to ED with c/o sore throat, HA, nasal congestion, loss of taste/smell x 1 week.     LOC: The patient is awake, alert, and aware of environment. The patient is AOX4 and speaking appropriately.   APPEARANCE: No acute distress noted.    HEENT: WDL, PERRLA  PSYCHOSOCIAL: Patient is calm and cooperative. Denies SI/HI.  SKIN: The skin is warm, dry, color consistent with ethnicity. No breakdown or brusing visible.  RESPIRATORY: Airway is open and patent. Bilateral chest rise and fall. Respiratory rate even and unlabored.  No accessory muscle use noted.  CARDIAC: Patient has a normal rate and rhythm. No complaints of chest pain.  ABDOMEN/GI: Soft, non tender. No distention noted. Denies n/v/d.   URINARY:  Voids independently without difficulty. No complaints of frequency, urgency, burning, or blood in urine.   NEUROLOGIC: Eyes open spontaneously. Speech clear.  Able to follow commands, demonstrating ability to actively and appropriately communicate within context of current conversation. Symmetrical facial muscles. Movement is purposeful. Denies dizziness/lightheadedness.  MUSCULOSKELETAL: No obvious deformities noted. Full ROM in all extremities.  PERIPHERAL VASCULAR: Cap refill <3 secs bilaterally. No peripheral edema noted. Denies numbness and tingling in extremities.

## 2022-01-05 ENCOUNTER — PROCEDURE VISIT (OUTPATIENT)
Dept: OBSTETRICS AND GYNECOLOGY | Facility: CLINIC | Age: 22
End: 2022-01-05
Payer: COMMERCIAL

## 2022-01-05 VITALS
WEIGHT: 293 LBS | HEIGHT: 68 IN | SYSTOLIC BLOOD PRESSURE: 122 MMHG | DIASTOLIC BLOOD PRESSURE: 70 MMHG | BODY MASS INDEX: 44.41 KG/M2

## 2022-01-05 DIAGNOSIS — Z97.5 CONTRACEPTION, DEVICE INTRAUTERINE: Primary | ICD-10-CM

## 2022-01-05 LAB
B-HCG UR QL: NEGATIVE
CTP QC/QA: YES

## 2022-01-05 PROCEDURE — 81025 URINE PREGNANCY TEST: CPT | Mod: S$GLB,,, | Performed by: OBSTETRICS & GYNECOLOGY

## 2022-01-05 PROCEDURE — 58300 INSERT INTRAUTERINE DEVICE: CPT | Mod: S$GLB,,, | Performed by: OBSTETRICS & GYNECOLOGY

## 2022-01-05 PROCEDURE — 81025 POCT URINE PREGNANCY: ICD-10-PCS | Mod: S$GLB,,, | Performed by: OBSTETRICS & GYNECOLOGY

## 2022-01-05 PROCEDURE — 58300 INSERTION OF IUD: ICD-10-PCS | Mod: S$GLB,,, | Performed by: OBSTETRICS & GYNECOLOGY

## 2022-01-05 NOTE — PROCEDURES
Insertion of IUD    Date/Time: 1/5/2022 11:15 AM  Performed by: Paola Dillon MD  Authorized by: Paola Dillon MD     Consent:     Consent obtained:  Written    Consent given by:  Patient    Procedure risks and benefits discussed: yes      Patient questions answered: yes      Patient agrees, verbalizes understanding, and wants to proceed: yes      Educational handouts given: yes      Instructions and paperwork completed: yes    Procedure:     Pelvic exam performed: yes      Negative urine pregnancy test: yes      Cervix cleaned and prepped: yes      Speculum placed in vagina: yes      Tenaculum applied to cervix: yes      Uterus sounded: yes      Uterus sound depth (cm):  10    IUD inserted with no complications: yes      IUD type:  Mirena    Strings trimmed: yes    1 Intra Uterine Device levonorgestreL 20 mcg/24 hours (7 yrs) 52 mg       Post-procedure:     Patient tolerated procedure well: yes      Patient will follow up after next period: yes      Paola Dillon

## 2022-01-06 ENCOUNTER — PATIENT MESSAGE (OUTPATIENT)
Dept: OBSTETRICS AND GYNECOLOGY | Facility: CLINIC | Age: 22
End: 2022-01-06
Payer: COMMERCIAL

## 2022-01-10 VITALS
WEIGHT: 293 LBS | BODY MASS INDEX: 44.41 KG/M2 | HEIGHT: 68 IN | TEMPERATURE: 98 F | RESPIRATION RATE: 18 BRPM | HEART RATE: 87 BPM | OXYGEN SATURATION: 98 % | DIASTOLIC BLOOD PRESSURE: 85 MMHG | SYSTOLIC BLOOD PRESSURE: 146 MMHG

## 2022-01-10 PROCEDURE — 99284 PR EMERGENCY DEPT VISIT,LEVEL IV: ICD-10-PCS | Mod: ,,, | Performed by: EMERGENCY MEDICINE

## 2022-01-10 PROCEDURE — 99284 EMERGENCY DEPT VISIT MOD MDM: CPT | Mod: 25

## 2022-01-10 PROCEDURE — 99284 EMERGENCY DEPT VISIT MOD MDM: CPT | Mod: ,,, | Performed by: EMERGENCY MEDICINE

## 2022-01-11 ENCOUNTER — HOSPITAL ENCOUNTER (EMERGENCY)
Facility: HOSPITAL | Age: 22
Discharge: HOME | End: 2022-01-11
Attending: EMERGENCY MEDICINE
Payer: COMMERCIAL

## 2022-01-11 DIAGNOSIS — N93.9 VAGINAL BLEEDING: Primary | ICD-10-CM

## 2022-01-11 LAB
B-HCG UR QL: NEGATIVE
BASOPHILS # BLD AUTO: 0.05 K/UL (ref 0–0.2)
BASOPHILS NFR BLD: 0.5 % (ref 0–1.9)
CTP QC/QA: YES
DIFFERENTIAL METHOD: ABNORMAL
EOSINOPHIL # BLD AUTO: 0.1 K/UL (ref 0–0.5)
EOSINOPHIL NFR BLD: 0.8 % (ref 0–8)
ERYTHROCYTE [DISTWIDTH] IN BLOOD BY AUTOMATED COUNT: 13.2 % (ref 11.5–14.5)
HCT VFR BLD AUTO: 42.2 % (ref 37–48.5)
HGB BLD-MCNC: 13.1 G/DL (ref 12–16)
IMM GRANULOCYTES # BLD AUTO: 0.03 K/UL (ref 0–0.04)
IMM GRANULOCYTES NFR BLD AUTO: 0.3 % (ref 0–0.5)
LYMPHOCYTES # BLD AUTO: 3.2 K/UL (ref 1–4.8)
LYMPHOCYTES NFR BLD: 34.4 % (ref 18–48)
MCH RBC QN AUTO: 28.1 PG (ref 27–31)
MCHC RBC AUTO-ENTMCNC: 31 G/DL (ref 32–36)
MCV RBC AUTO: 90 FL (ref 82–98)
MONOCYTES # BLD AUTO: 0.6 K/UL (ref 0.3–1)
MONOCYTES NFR BLD: 6.3 % (ref 4–15)
NEUTROPHILS # BLD AUTO: 5.4 K/UL (ref 1.8–7.7)
NEUTROPHILS NFR BLD: 57.7 % (ref 38–73)
NRBC BLD-RTO: 0 /100 WBC
PLATELET # BLD AUTO: 377 K/UL (ref 150–450)
PMV BLD AUTO: 10.3 FL (ref 9.2–12.9)
RBC # BLD AUTO: 4.67 M/UL (ref 4–5.4)
WBC # BLD AUTO: 9.31 K/UL (ref 3.9–12.7)

## 2022-01-11 PROCEDURE — 85025 COMPLETE CBC W/AUTO DIFF WBC: CPT | Performed by: EMERGENCY MEDICINE

## 2022-01-11 PROCEDURE — 81025 URINE PREGNANCY TEST: CPT | Performed by: EMERGENCY MEDICINE

## 2022-01-11 NOTE — ED TRIAGE NOTES
Brittaney Esposito, a 21 y.o. female presents to the ED w/ complaint of     Triage note:  Chief Complaint   Patient presents with    Vaginal Bleeding     Pt c/o using 15 tampons per day since Thursday. She had an IUD placed on Wednesday. Hx of PCOS.      Review of patient's allergies indicates:   Allergen Reactions    Grass pollen-june grass standard      Past Medical History:   Diagnosis Date    ADHD (attention deficit hyperactivity disorder)     Anxiety     Asthma     as a child    Bronchitis     Depression     Hypersomnia     PCOS (polycystic ovarian syndrome)     Runner's knee, left     long time since she saw someone for this    Uterine cyst     Uterine fibroid

## 2022-01-11 NOTE — Clinical Note
"Brittaney Josephlisa Esposito was seen and treated in our emergency department on 1/10/2022.  She may return to work on 01/13/2022.       If you have any questions or concerns, please don't hesitate to call.      Aaron Cook MD"

## 2022-01-11 NOTE — ED PROVIDER NOTES
Encounter Date: 1/10/2022       History     Chief Complaint   Patient presents with    Vaginal Bleeding     Pt c/o using 15 tampons per day since Thursday. She had an IUD placed on Wednesday. Hx of PCOS.      21-year-old female presents with 6 days of significant vaginal bleeding and pelvic pain.  Symptoms started a day after having an IUD inserted.  She states that the discomfort is persistent and achy.  She denies any fevers.  She states she has a follow-up appoint with her OBGYN in a month and has not contacted her regarding the symptoms.        Review of patient's allergies indicates:   Allergen Reactions    Grass pollen-june grass standard      Past Medical History:   Diagnosis Date    ADHD (attention deficit hyperactivity disorder)     Anxiety     Asthma     as a child    Bronchitis     Depression     Hypersomnia     PCOS (polycystic ovarian syndrome)     Runner's knee, left     long time since she saw someone for this    Uterine cyst     Uterine fibroid      Past Surgical History:   Procedure Laterality Date    ADENOIDECTOMY      TONSILLECTOMY       Family History   Problem Relation Age of Onset    Hypertension Mother     Dislocations Mother     Diabetes Mother     Obesity Mother     Depression Mother     Cancer Maternal Grandmother         Skin, lung, and breast    Diabetes Maternal Grandmother     Dislocations Maternal Grandmother     Breast cancer Maternal Grandmother     Obesity Maternal Grandmother     Skin cancer Maternal Grandmother     Hypertension Father     Heart disease Father     Skin cancer Father     Endometriosis Sister     Heart disease Paternal Grandfather     Colon cancer Neg Hx     Ovarian cancer Neg Hx     Esophageal cancer Neg Hx      Social History     Tobacco Use    Smoking status: Current Every Day Smoker     Packs/day: 0.50     Years: 4.00     Pack years: 2.00     Types: Cigarettes    Smokeless tobacco: Never Used   Substance Use Topics    Alcohol  use: No    Drug use: Yes     Types: Marijuana     Review of Systems   Constitutional: Negative for chills and fever.   HENT: Negative for congestion.    Respiratory: Negative for shortness of breath.    Cardiovascular: Negative for chest pain.   Gastrointestinal: Positive for abdominal pain. Negative for vomiting.   Genitourinary: Negative for frequency.   Musculoskeletal: Negative for back pain.   Neurological: Positive for dizziness. Negative for headaches.   Psychiatric/Behavioral: Negative for agitation.       Physical Exam     Initial Vitals [01/10/22 2317]   BP Pulse Resp Temp SpO2   (!) 146/85 87 18 98.3 °F (36.8 °C) 98 %      MAP       --         Physical Exam    Constitutional: She appears well-developed and well-nourished.   HENT:   Head: Normocephalic.   Eyes: Pupils are equal, round, and reactive to light.   Neck: Neck supple. No thyromegaly present.   Normal range of motion.  Cardiovascular: Normal rate, regular rhythm, normal heart sounds and intact distal pulses.   Pulmonary/Chest: Breath sounds normal. No stridor. No respiratory distress. She has no wheezes. She has no rales.   Abdominal: Abdomen is soft. Bowel sounds are normal. She exhibits no distension.   Mild pelvic tenderness   Genitourinary:    Genitourinary Comments: Mild vaginal bleeding     Musculoskeletal:      Cervical back: Normal range of motion and neck supple.           ED Course   Procedures  Labs Reviewed   CBC W/ AUTO DIFFERENTIAL - Abnormal; Notable for the following components:       Result Value    MCHC 31.0 (*)     All other components within normal limits   POCT URINE PREGNANCY          Imaging Results    None          Medications - No data to display  Medical Decision Making:   History:   Old Medical Records: I decided to obtain old medical records.  Initial Assessment:   Patient with reported heavy vaginal bleeding for the last 5 days.  She has a benign abdomen.  Will check labs, UPT, pelvic exam  ED Management:  Hemoglobin  was 13 and stable.  UPT was negative.  Pelvic exam showed mild bleeding.  Patient stable for discharge.  I attempted to contact her OBGYN but patient requested discharge prior to completion.                      Clinical Impression:   Final diagnoses:  [N93.9] Vaginal bleeding (Primary)          ED Disposition Condition    Discharge Stable        ED Prescriptions     None        Follow-up Information     Follow up With Specialties Details Why Contact Info    Paola Dillon MD Obstetrics and Gynecology Call today  [address] [facility] [address]  [phone]      [facility] - Emergency Dept Emergency Medicine  If symptoms worsen [address] [facility] [id number] [phone]           Aaron Cook MD  01/12/22 0127

## 2022-01-18 ENCOUNTER — PATIENT MESSAGE (OUTPATIENT)
Dept: BARIATRICS | Facility: CLINIC | Age: 22
End: 2022-01-18
Payer: COMMERCIAL

## 2022-01-24 ENCOUNTER — CLINICAL SUPPORT (OUTPATIENT)
Dept: BARIATRICS | Facility: CLINIC | Age: 22
End: 2022-01-24
Payer: COMMERCIAL

## 2022-01-24 VITALS — BODY MASS INDEX: 52.39 KG/M2 | WEIGHT: 293 LBS

## 2022-01-24 DIAGNOSIS — Z71.3 DIETARY COUNSELING AND SURVEILLANCE: ICD-10-CM

## 2022-01-24 DIAGNOSIS — E66.01 MORBID OBESITY WITH BODY MASS INDEX (BMI) OF 50.0 TO 59.9 IN ADULT: ICD-10-CM

## 2022-01-24 PROCEDURE — 97803 MED NUTRITION INDIV SUBSEQ: CPT | Mod: S$GLB,,, | Performed by: DIETITIAN, REGISTERED

## 2022-01-24 PROCEDURE — 99499 NO LOS: ICD-10-PCS | Mod: S$GLB,,, | Performed by: DIETITIAN, REGISTERED

## 2022-01-24 PROCEDURE — 97803 PR MED NUTR THER, SUBSQ, INDIV, EA 15 MIN: ICD-10-PCS | Mod: S$GLB,,, | Performed by: DIETITIAN, REGISTERED

## 2022-01-24 PROCEDURE — 99499 UNLISTED E&M SERVICE: CPT | Mod: S$GLB,,, | Performed by: DIETITIAN, REGISTERED

## 2022-01-24 NOTE — PROGRESS NOTES
NUTRITION NOTE    Referring Physician: Neftali Noonan M.D.   Reason for MNT Referral: Medically Supervised Diet pending  Gastric undecided    Patient presents for f/u visit for MSD with weight loss over the past month; total weight loss by making numerous dietary and lifestyle changes.  Recently was placed on birth control  CLINICAL DATA:  21 y.o. female.    Current Weight: 344 lbs  Weight Change Since Initial Visit: 0 lbs  Ideal Body Weight: 147 lbs  Body mass index is 52.39 kg/m².   Last WT: 341 lbs  INitial Wtl: 344 lbs    DAILY NUTRITIONAL NEEDS: pre-op nutritional bariatric guidelines to promote weight loss  [id number] Calories    Grams Protein    CURRENT DIET:  Reduced-calorie diet. Hello Fresh healthy foods high protein low carb  Diet Recall: Food records are not present.  B: skips  L: Skips   D: Meat and veggies on occasion potatoes  Snacks: oc franny cracker less sugar than most franny crackers    Diet Includes: 2 meals per day and 1 snacks no protein shakes currently due to financial constraints  Meal Pattern: Improved.  Protein Supplements: 0 per day. Financial issues  Snacking: Adequate. Snacks include small amt of starchy carbs.  Vegetables: Likes a variety. Eats daily. Breakfast and dinner  Fruits: Likes a variety. Eats daily pears daily.  Beverages: sugary beverages and almond milk or oat milk and diet v8 splash and ga zero  Dining Out:  Weekly. once a week Mostly fast food.  Cooking at home: Weekly. 6 times per week Mostly baked and grilled meat, fish and vegetables.    CURRENT EXERCISE:  Adequate walk every day at work more than 30 minutes    Vitamins / Minerals / Herbs:   Vitamin D rx    LABS:  Reviewed.  Low vitamin D  Low sat iron    Food Allergies:   Possible lactose intolerance    Social:  Works regular daytime shifts.  Alcohol: once.  Smokin cigarettes per day marijuana Will quit in 3 weeks to test at next nutrition appt in March    ASSESSMENT:  Patient demonstrates some  willingness to change lifestyle habits as evidenced by weight loss, good exercise, increased fruits and increased vegetables.    Doing fairly well with working on greatest challenges (starchy CHO and soda intake).    Barriers to Education:  none  Stage of Change:  action    NUTRITION DIAGNOSIS:  Morbid Obesity related to Excessive calorie intake as evidence by BMI.  Status: Improved    PLAN:  Pt is potential candidate for surgery.    Diet: Adjust diet plan.  Work on expanding variety of vegetables.  Work on gradually cutting back on starchy CHO in the diet.  1200-calorie diet.  1500-calorie diet.  5-6 meals per day.  Start including protein supplements in the diet plan daily.  Reduce frequency of dining out.  Return to clinic.   Quit  smoking   Protein shakes     Exercise: Maintain.    Return to clinic in one month.  Needs additional visit(s) with RD.    Communicated nutrition plan with bariatric team.    SESSION TIME:  30 minutes

## 2022-02-01 ENCOUNTER — PATIENT MESSAGE (OUTPATIENT)
Dept: OBSTETRICS AND GYNECOLOGY | Facility: CLINIC | Age: 22
End: 2022-02-01
Payer: COMMERCIAL

## 2022-03-02 ENCOUNTER — HOSPITAL ENCOUNTER (EMERGENCY)
Facility: HOSPITAL | Age: 22
Discharge: HOME | End: 2022-03-02
Attending: EMERGENCY MEDICINE
Payer: COMMERCIAL

## 2022-03-02 VITALS
SYSTOLIC BLOOD PRESSURE: 146 MMHG | TEMPERATURE: 99 F | DIASTOLIC BLOOD PRESSURE: 84 MMHG | HEART RATE: 92 BPM | WEIGHT: 293 LBS | OXYGEN SATURATION: 97 % | BODY MASS INDEX: 48.66 KG/M2 | RESPIRATION RATE: 18 BRPM

## 2022-03-02 DIAGNOSIS — H60.502 ACUTE OTITIS EXTERNA OF LEFT EAR, UNSPECIFIED TYPE: Primary | ICD-10-CM

## 2022-03-02 PROCEDURE — 25000003 PHARM REV CODE 250: Performed by: PHYSICIAN ASSISTANT

## 2022-03-02 PROCEDURE — 99284 EMERGENCY DEPT VISIT MOD MDM: CPT

## 2022-03-02 PROCEDURE — 99284 EMERGENCY DEPT VISIT MOD MDM: CPT | Mod: ,,, | Performed by: EMERGENCY MEDICINE

## 2022-03-02 PROCEDURE — 99284 PR EMERGENCY DEPT VISIT,LEVEL IV: ICD-10-PCS | Mod: ,,, | Performed by: EMERGENCY MEDICINE

## 2022-03-02 RX ORDER — CIPROFLOXACIN AND DEXAMETHASONE 3; 1 MG/ML; MG/ML
4 SUSPENSION/ DROPS AURICULAR (OTIC)
Status: DISCONTINUED | OUTPATIENT
Start: 2022-03-02 | End: 2022-03-02

## 2022-03-02 RX ORDER — IBUPROFEN 600 MG/1
600 TABLET ORAL
Status: COMPLETED | OUTPATIENT
Start: 2022-03-02 | End: 2022-03-02

## 2022-03-02 RX ORDER — CIPROFLOXACIN AND DEXAMETHASONE 3; 1 MG/ML; MG/ML
4 SUSPENSION/ DROPS AURICULAR (OTIC) 2 TIMES DAILY
Qty: 7.5 ML | Refills: 0 | Status: SHIPPED | OUTPATIENT
Start: 2022-03-02 | End: 2022-03-09

## 2022-03-02 RX ORDER — NAPROXEN 500 MG/1
500 TABLET ORAL 2 TIMES DAILY WITH MEALS
Qty: 20 TABLET | Refills: 0 | Status: SHIPPED | OUTPATIENT
Start: 2022-03-02 | End: 2022-10-05

## 2022-03-02 RX ORDER — CIPROFLOXACIN AND DEXAMETHASONE 3; 1 MG/ML; MG/ML
4 SUSPENSION/ DROPS AURICULAR (OTIC)
Status: COMPLETED | OUTPATIENT
Start: 2022-03-02 | End: 2022-03-02

## 2022-03-02 RX ADMIN — CIPROFLOXACIN AND DEXAMETHASONE 4 DROP: 3; 1 SUSPENSION/ DROPS AURICULAR (OTIC) at 10:03

## 2022-03-02 RX ADMIN — IBUPROFEN 600 MG: 600 TABLET ORAL at 10:03

## 2022-03-03 NOTE — DISCHARGE INSTRUCTIONS
Take the prescribed Ciprodex drops as directed for your ear infection  Follow-up with ENT for further management  Return to the emergency room for new, worsening, or concerning symptoms.

## 2022-03-03 NOTE — ED PROVIDER NOTES
Encounter Date: 3/2/2022       History     Chief Complaint   Patient presents with    Otalgia     Pt c/o L ear pain x1 week. States hx of frequent ear infections      The history is provided by the patient and medical records. No  was used.      Brittaney Esposito is a 21 y.o. female with medical history of PCOS, ADHD, Depression, Anxiety, Asthma, Uterine fibroids presenting to the ED with the chief complaint of L ear pain.     Patient reports L ear pain for 1 week. Reports associated drainage, pain with auricle manipulation, pain with opening/closing jaw. Concerned she has a recurrent ear infection. She uses ear pods occasionally and denies putting other objects into her ear. No fever, headache, sinus congestion, sore throat, neck pain, chest pain, SOB, cough, vomiting.     Review of patient's allergies indicates:   Allergen Reactions    Grass pollen-june grass standard      Past Medical History:   Diagnosis Date    ADHD (attention deficit hyperactivity disorder)     Anxiety     Asthma     as a child    Bronchitis     Depression     Hypersomnia     PCOS (polycystic ovarian syndrome)     Runner's knee, left     long time since she saw someone for this    Uterine cyst     Uterine fibroid      Past Surgical History:   Procedure Laterality Date    ADENOIDECTOMY      TONSILLECTOMY       Family History   Problem Relation Age of Onset    Hypertension Mother     Dislocations Mother     Diabetes Mother     Obesity Mother     Depression Mother     Cancer Maternal Grandmother         Skin, lung, and breast    Diabetes Maternal Grandmother     Dislocations Maternal Grandmother     Breast cancer Maternal Grandmother     Obesity Maternal Grandmother     Skin cancer Maternal Grandmother     Hypertension Father     Heart disease Father     Skin cancer Father     Endometriosis Sister     Heart disease Paternal Grandfather     Colon cancer Neg Hx     Ovarian cancer Neg Hx      Esophageal cancer Neg Hx      Social History     Tobacco Use    Smoking status: Current Every Day Smoker     Packs/day: 0.50     Years: 4.00     Pack years: 2.00     Types: Cigarettes    Smokeless tobacco: Never Used   Substance Use Topics    Alcohol use: No    Drug use: Yes     Types: Marijuana     Review of Systems   Constitutional: Negative for fever.   HENT: Positive for ear discharge and ear pain. Negative for congestion, dental problem, rhinorrhea, sinus pressure, sinus pain, sore throat, tinnitus and trouble swallowing.    Eyes: Negative for pain.   Respiratory: Negative for shortness of breath.    Cardiovascular: Negative for chest pain.   Gastrointestinal: Negative for nausea.   Genitourinary: Negative for dysuria.   Musculoskeletal: Negative for back pain.   Skin: Negative for rash.   Neurological: Negative for weakness.   Hematological: Does not bruise/bleed easily.       Physical Exam     Initial Vitals [03/02/22 2133]   BP Pulse Resp Temp SpO2   (!) 146/84 92 18 99 °F (37.2 °C) 97 %      MAP       --         Physical Exam    Constitutional: She appears well-developed and well-nourished. She is not diaphoretic. No distress.   HENT:   Head: Normocephalic and atraumatic.   Right Ear: No drainage. No mastoid tenderness.   Left Ear: No drainage. No mastoid tenderness. Tympanic membrane is injected.   L ear:  +erythema and edema to external ear canal  +tenderness with auricle manipulation and TTP anterior to tragus.   No mastoid tenderness or swelling   Eyes: EOM are normal. Pupils are equal, round, and reactive to light.   Neck: Neck supple.   Normal range of motion.  Cardiovascular: Normal rate and regular rhythm.   Pulmonary/Chest: No respiratory distress.   Speaking full sentences without difficulty. No accessory muscle use.   Musculoskeletal:         General: Normal range of motion.      Cervical back: Normal range of motion and neck supple.     Neurological: She is alert and oriented to person,  place, and time.   Skin: Skin is warm and dry. No rash noted.       ED Course   Procedures  Labs Reviewed - No data to display       Imaging Results    None          Medications   ibuprofen tablet 600 mg (600 mg Oral Given 3/2/22 2248)   ciprofloxacin-dexamethasone 0.3-0.1% otic suspension 4 drop (4 drops Left Ear Given 3/2/22 2249)     Medical Decision Making:   History:   Old Medical Records: I decided to obtain old medical records.  Old Records Summarized: records from clinic visits.  Clinical Tests:   Lab Tests: Reviewed and Ordered       APC / Resident Notes:   21 y.o. female with medical history of PCOS, ADHD, Depression, Anxiety, Asthma, Uterine fibroids presenting to the ED c/o L ear pain, drainage, pain with jaw movements for 1 week. History of ear infections.     Clinical presentation consistent with otitis externa. Lower suspicion for otitis media. Do not suspect malignant otitis externa, mastoiditis, abscess. Outpatient follow-up with ENT appropriate given recurrent infections. RX for Ciprodex provided. Patient expresses understanding and agreeable to the plan. Return to ED precautions given for new, worsening, or concerning symptoms.                 Clinical Impression:   Final diagnoses:  [H60.502] Acute otitis externa of left ear, unspecified type (Primary)          ED Disposition Condition    Discharge Stable        ED Prescriptions     Medication Sig Dispense Start Date End Date Auth. Provider    ciprofloxacin-dexamethasone 0.3-0.1% (CIPRODEX) 0.3-0.1 % DrpS Place 4 drops into both ears 2 (two) times daily. for 7 days 7.5 mL 3/2/2022 3/9/2022 Levy Tong PA-C    naproxen (NAPROSYN) 500 MG tablet Take 1 tablet (500 mg total) by mouth 2 (two) times daily with meals. 20 tablet 3/2/2022  Levy Tong PA-C        Follow-up Information     Follow up With Specialties Details Why Contact Info Additional Information    [facility]   [address]  [place]  [place] [id number] [phone] [facility] - Main Building, 4th Floor Please park in [facility] and use Clinic elevator           Levy Tong PA-C  03/03/22 0108

## 2022-03-07 ENCOUNTER — PATIENT MESSAGE (OUTPATIENT)
Dept: BARIATRICS | Facility: CLINIC | Age: 22
End: 2022-03-07
Payer: COMMERCIAL

## 2022-03-21 ENCOUNTER — TELEPHONE (OUTPATIENT)
Dept: BARIATRICS | Facility: CLINIC | Age: 22
End: 2022-03-21
Payer: COMMERCIAL

## 2022-03-21 NOTE — TELEPHONE ENCOUNTER
LELOM for pt to call back at [phone] re: workup for bariatric surgery.  Asking pt status on smoking, did she need any assistance getting anything scheduled, and will need a 2022 phone appt with FC.

## 2022-04-07 ENCOUNTER — PATIENT MESSAGE (OUTPATIENT)
Dept: BARIATRICS | Facility: CLINIC | Age: 22
End: 2022-04-07

## 2022-04-12 ENCOUNTER — PATIENT MESSAGE (OUTPATIENT)
Dept: BARIATRICS | Facility: CLINIC | Age: 22
End: 2022-04-12
Payer: COMMERCIAL

## 2022-04-19 ENCOUNTER — DOCUMENTATION ONLY (OUTPATIENT)
Dept: BARIATRICS | Facility: CLINIC | Age: 22
End: 2022-04-19
Payer: COMMERCIAL

## 2022-04-19 NOTE — PROGRESS NOTES
Maral Lizama, PhD  Linda Park PA-C; Geno Garcia, GALEN; Brielle Willis RD  OK. She is scheduled to see Brielle again next week, just as a heads up.             Previous Messages       ----- Message -----   From: Linda Park PA-C   Sent: 3/16/2022   8:48 AM CDT   To: Geno Garcia, GALEN, Maral Lizama, PhD     She was supposed to meet with you first, months ago...  She should NOT move forward or have any additional testing for anything until she completes the evaluation with you Maral.       Thank you for the heads up.     Linda     ----- Message -----   From: Maral Lizama, PhD   Sent: 3/14/2022   3:07 PM CDT   To: Linda Park PA-C, Geno Garcia, GALEN     Hello -   Just wanted to inform you that this patient did not show for her psych testing last week, so her evaluation with me tomorrow was cancelled. Messages were left asking her to call with us to reschedule. Because of her diagnosis of Schizoaffective Disorder, testing is required to help me know whether that diagnosis is accurate.     Letting you know since she's been on the selection meeting list. I won't meet with her until she does testing first.     Thanks,   Maral

## 2022-04-19 NOTE — PROGRESS NOTES
Due to diagnosis of Schizoaffective, pt will need a psych evaluation here at main campus prior to any further appts within the bariatric department- information added to speciality comments denoting this requirement.

## 2022-05-05 ENCOUNTER — PATIENT MESSAGE (OUTPATIENT)
Dept: BARIATRICS | Facility: CLINIC | Age: 22
End: 2022-05-05
Payer: COMMERCIAL

## 2022-05-10 ENCOUNTER — PATIENT MESSAGE (OUTPATIENT)
Dept: BARIATRICS | Facility: CLINIC | Age: 22
End: 2022-05-10
Payer: COMMERCIAL

## 2022-05-27 ENCOUNTER — TELEPHONE (OUTPATIENT)
Dept: BARIATRICS | Facility: CLINIC | Age: 22
End: 2022-05-27
Payer: COMMERCIAL

## 2022-05-27 NOTE — TELEPHONE ENCOUNTER
Phoned number listed for patient and the recording stated that we have reached Jan Esposito and to leave a message.  Left message to have patient RC to discuss the bariatric workup

## 2022-05-27 NOTE — TELEPHONE ENCOUNTER
----- Message from Trupti Olivera RN sent at 5/27/2022  8:43 AM CDT -----    ----- Message -----  From: Geno Garcia RN  Sent: 4/12/2022   3:51 PM CDT  To: Trupti Olivera RN, Linda Prak PA-C, #    Lesa,     Please call pt and let her know that she needs to schedule her psych testing and evaluation prior to any other work up and change patient status on dashboard accordingly if not proceeding.      Thanks!  Geno    ----- Message -----  From: Maral Lizama, PhD  Sent: 3/16/2022   8:55 AM CDT  To: Linda Park PA-C, Geno Garcia, GALEN, #    OK. She is scheduled to see Brielle again next week, just as a heads up.     ----- Message -----  From: Linda Park PA-C  Sent: 3/16/2022   8:48 AM CDT  To: Geno Garcia RN, Maral Lizama, PhD    She was supposed to meet with you first, months ago...  She should NOT move forward or have any additional testing for anything until she completes the evaluation with jim Alicia.      Thank you for the heads up.    Linda    ----- Message -----  From: Maral Lizama, PhD  Sent: 3/14/2022   3:07 PM CDT  To: Linda Park PA-C, Geno Garcia, RN    Hello -   Just wanted to inform you that this patient did not show for her psych testing last week, so her evaluation with me tomorrow was cancelled. Messages were left asking her to call with us to reschedule. Because of her diagnosis of Schizoaffective Disorder, testing is required to help me know whether that diagnosis is accurate.    Letting you know since she's been on the selection meeting list. I won't meet with her until she does testing first.     Thanks,  Maral

## 2022-06-10 ENCOUNTER — PATIENT MESSAGE (OUTPATIENT)
Dept: BARIATRICS | Facility: CLINIC | Age: 22
End: 2022-06-10
Payer: COMMERCIAL

## 2022-06-14 ENCOUNTER — PATIENT MESSAGE (OUTPATIENT)
Dept: BARIATRICS | Facility: CLINIC | Age: 22
End: 2022-06-14
Payer: COMMERCIAL

## 2022-06-24 ENCOUNTER — TELEPHONE (OUTPATIENT)
Dept: BARIATRICS | Facility: CLINIC | Age: 22
End: 2022-06-24
Payer: COMMERCIAL

## 2022-07-05 ENCOUNTER — PATIENT MESSAGE (OUTPATIENT)
Dept: BARIATRICS | Facility: CLINIC | Age: 22
End: 2022-07-05

## 2022-07-12 ENCOUNTER — OFFICE VISIT (OUTPATIENT)
Dept: OBSTETRICS AND GYNECOLOGY | Facility: CLINIC | Age: 22
End: 2022-07-12
Payer: COMMERCIAL

## 2022-07-12 VITALS
WEIGHT: 293 LBS | BODY MASS INDEX: 44.41 KG/M2 | DIASTOLIC BLOOD PRESSURE: 78 MMHG | SYSTOLIC BLOOD PRESSURE: 117 MMHG | HEIGHT: 68 IN

## 2022-07-12 DIAGNOSIS — Z30.431 IUD CHECK UP: Primary | ICD-10-CM

## 2022-07-12 DIAGNOSIS — Z30.432 ENCOUNTER FOR IUD REMOVAL: ICD-10-CM

## 2022-07-12 DIAGNOSIS — Z30.017 ENCOUNTER FOR INITIAL PRESCRIPTION OF IMPLANTABLE SUBDERMAL CONTRACEPTIVE: ICD-10-CM

## 2022-07-12 LAB
B-HCG UR QL: NEGATIVE
CTP QC/QA: YES

## 2022-07-12 PROCEDURE — 58301 REMOVE INTRAUTERINE DEVICE: CPT | Mod: S$GLB,,, | Performed by: OBSTETRICS & GYNECOLOGY

## 2022-07-12 PROCEDURE — 99999 PR PBB SHADOW E&M-EST. PATIENT-LVL III: CPT | Mod: PBBFAC,,, | Performed by: OBSTETRICS & GYNECOLOGY

## 2022-07-12 PROCEDURE — 1159F PR MEDICATION LIST DOCUMENTED IN MEDICAL RECORD: ICD-10-PCS | Mod: CPTII,S$GLB,, | Performed by: OBSTETRICS & GYNECOLOGY

## 2022-07-12 PROCEDURE — 3074F PR MOST RECENT SYSTOLIC BLOOD PRESSURE < 130 MM HG: ICD-10-PCS | Mod: CPTII,S$GLB,, | Performed by: OBSTETRICS & GYNECOLOGY

## 2022-07-12 PROCEDURE — 3008F BODY MASS INDEX DOCD: CPT | Mod: CPTII,S$GLB,, | Performed by: OBSTETRICS & GYNECOLOGY

## 2022-07-12 PROCEDURE — 3074F SYST BP LT 130 MM HG: CPT | Mod: CPTII,S$GLB,, | Performed by: OBSTETRICS & GYNECOLOGY

## 2022-07-12 PROCEDURE — 81025 URINE PREGNANCY TEST: CPT | Mod: S$GLB,,, | Performed by: OBSTETRICS & GYNECOLOGY

## 2022-07-12 PROCEDURE — 3078F DIAST BP <80 MM HG: CPT | Mod: CPTII,S$GLB,, | Performed by: OBSTETRICS & GYNECOLOGY

## 2022-07-12 PROCEDURE — 1159F MED LIST DOCD IN RCRD: CPT | Mod: CPTII,S$GLB,, | Performed by: OBSTETRICS & GYNECOLOGY

## 2022-07-12 PROCEDURE — 99213 OFFICE O/P EST LOW 20 MIN: CPT | Mod: 25,S$GLB,, | Performed by: OBSTETRICS & GYNECOLOGY

## 2022-07-12 PROCEDURE — 3008F PR BODY MASS INDEX (BMI) DOCUMENTED: ICD-10-PCS | Mod: CPTII,S$GLB,, | Performed by: OBSTETRICS & GYNECOLOGY

## 2022-07-12 PROCEDURE — 81025 POCT URINE PREGNANCY: ICD-10-PCS | Mod: S$GLB,,, | Performed by: OBSTETRICS & GYNECOLOGY

## 2022-07-12 PROCEDURE — 99213 PR OFFICE/OUTPT VISIT, EST, LEVL III, 20-29 MIN: ICD-10-PCS | Mod: 25,S$GLB,, | Performed by: OBSTETRICS & GYNECOLOGY

## 2022-07-12 PROCEDURE — 3078F PR MOST RECENT DIASTOLIC BLOOD PRESSURE < 80 MM HG: ICD-10-PCS | Mod: CPTII,S$GLB,, | Performed by: OBSTETRICS & GYNECOLOGY

## 2022-07-12 PROCEDURE — 58301 REMOVAL OF IUD: ICD-10-PCS | Mod: S$GLB,,, | Performed by: OBSTETRICS & GYNECOLOGY

## 2022-07-12 PROCEDURE — 99999 PR PBB SHADOW E&M-EST. PATIENT-LVL III: ICD-10-PCS | Mod: PBBFAC,,, | Performed by: OBSTETRICS & GYNECOLOGY

## 2022-07-12 NOTE — PROGRESS NOTES
Gynecology    SUBJECTIVE:     Chief Complaint: IUD Check, Menorrhagia, and Dyspareunia       History of Present Illness:  22 year old who presents for IUD check.  She reports that she has had pain with intercourse ever since placement in January.  She never followed up for the IUD string check.  She also reports some irregular bleeding as well.  She had intermittent spotting following placement, but then had a one month long period earlier in June.  Her bleeding stopped about 2-3 weeks ago and she has been sexually active since then without any other contraception.      Review of Systems:  Review of Systems   Genitourinary: Positive for dyspareunia, menstrual problem and pelvic pain. Negative for menorrhagia, vaginal bleeding, vaginal discharge and vaginal pain.        OBJECTIVE:     Physical Exam:  Physical Exam  Vitals and nursing note reviewed.   Constitutional:       Appearance: She is well-developed.   Pulmonary:      Effort: Pulmonary effort is normal.   Abdominal:      Palpations: Abdomen is soft.   Genitourinary:     Labia:         Right: No rash, tenderness, lesion or injury.         Left: No rash, tenderness, lesion or injury.       Vagina: Normal. No signs of injury and foreign body. No vaginal discharge, erythema, tenderness or bleeding.      Cervix: No cervical motion tenderness, discharge or friability.      Uterus: Not deviated, not enlarged, not fixed and not tender.       Adnexa:         Right: No mass, tenderness or fullness.          Left: No mass, tenderness or fullness.        Comments: Urethra: normal appearing urethra with no masses, tenderness or lesions  Urethral meatus: normal size, anterior vaginal wall with no prolapse, no lesions  IUD protruding out of the cervix about 1 cm.-- see removal procedure note  Neurological:      Mental Status: She is alert and oriented to person, place, and time.   Psychiatric:         Behavior: Behavior normal.         Thought Content: Thought content normal.          Judgment: Judgment normal.           ASSESSMENT:       ICD-10-CM ICD-9-CM    1. IUD check up  Z30.431 V25.42    2. Encounter for IUD removal  Z30.432 V25.12           Plan:      Brittaney was seen today for iud check, menorrhagia and dyspareunia.    Diagnoses and all orders for this visit:    IUD check up    Encounter for IUD removal    - IUD removed today with patient's permission  - she was offered and declines emergency contraception; reports she would be okay with pregnancy if it happens  - discussed other forms of contraception; patient would like to go back to the nexplanon; discussed the weight limitations with this device.  She is aware and would like to use it anyway  - advised her to take a pregnancy test in 2 weeks  - RTC in one month for nexplanon placement, used condoms in the meantime    No orders of the defined types were placed in this encounter.        Paola Dillon

## 2022-08-02 ENCOUNTER — PATIENT MESSAGE (OUTPATIENT)
Dept: BARIATRICS | Facility: CLINIC | Age: 22
End: 2022-08-02
Payer: COMMERCIAL

## 2022-08-04 ENCOUNTER — PATIENT MESSAGE (OUTPATIENT)
Dept: BARIATRICS | Facility: CLINIC | Age: 22
End: 2022-08-04
Payer: COMMERCIAL

## 2022-09-07 ENCOUNTER — PATIENT MESSAGE (OUTPATIENT)
Dept: BARIATRICS | Facility: CLINIC | Age: 22
End: 2022-09-07
Payer: COMMERCIAL

## 2022-09-23 ENCOUNTER — HOSPITAL ENCOUNTER (EMERGENCY)
Facility: HOSPITAL | Age: 22
Discharge: HOME | End: 2022-09-23
Attending: EMERGENCY MEDICINE
Payer: COMMERCIAL

## 2022-09-23 VITALS
HEIGHT: 67 IN | HEART RATE: 85 BPM | SYSTOLIC BLOOD PRESSURE: 142 MMHG | OXYGEN SATURATION: 99 % | BODY MASS INDEX: 45.99 KG/M2 | TEMPERATURE: 99 F | WEIGHT: 293 LBS | DIASTOLIC BLOOD PRESSURE: 82 MMHG | RESPIRATION RATE: 18 BRPM

## 2022-09-23 DIAGNOSIS — M25.561 BILATERAL CHRONIC KNEE PAIN: Primary | ICD-10-CM

## 2022-09-23 DIAGNOSIS — G89.29 BILATERAL CHRONIC KNEE PAIN: Primary | ICD-10-CM

## 2022-09-23 DIAGNOSIS — M25.562 BILATERAL CHRONIC KNEE PAIN: Primary | ICD-10-CM

## 2022-09-23 PROCEDURE — 96372 THER/PROPH/DIAG INJ SC/IM: CPT | Performed by: EMERGENCY MEDICINE

## 2022-09-23 PROCEDURE — 25000003 PHARM REV CODE 250: Performed by: EMERGENCY MEDICINE

## 2022-09-23 PROCEDURE — 99284 PR EMERGENCY DEPT VISIT,LEVEL IV: ICD-10-PCS | Mod: ,,, | Performed by: EMERGENCY MEDICINE

## 2022-09-23 PROCEDURE — 63600175 PHARM REV CODE 636 W HCPCS: Performed by: EMERGENCY MEDICINE

## 2022-09-23 PROCEDURE — 99284 EMERGENCY DEPT VISIT MOD MDM: CPT | Mod: ,,, | Performed by: EMERGENCY MEDICINE

## 2022-09-23 PROCEDURE — 99284 EMERGENCY DEPT VISIT MOD MDM: CPT | Mod: 25

## 2022-09-23 RX ORDER — LIDOCAINE 560 MG/1
1 PATCH PERCUTANEOUS; TOPICAL; TRANSDERMAL 2 TIMES DAILY
Qty: 40 PATCH | Refills: 0 | Status: SHIPPED | OUTPATIENT
Start: 2022-09-23 | End: 2022-10-03

## 2022-09-23 RX ORDER — KETOROLAC TROMETHAMINE 30 MG/ML
15 INJECTION, SOLUTION INTRAMUSCULAR; INTRAVENOUS
Status: DISCONTINUED | OUTPATIENT
Start: 2022-09-23 | End: 2022-09-23

## 2022-09-23 RX ORDER — KETOROLAC TROMETHAMINE 30 MG/ML
15 INJECTION, SOLUTION INTRAMUSCULAR; INTRAVENOUS
Status: COMPLETED | OUTPATIENT
Start: 2022-09-23 | End: 2022-09-23

## 2022-09-23 RX ORDER — LIDOCAINE 50 MG/G
2 PATCH TOPICAL
Status: DISCONTINUED | OUTPATIENT
Start: 2022-09-23 | End: 2022-09-23 | Stop reason: HOSPADM

## 2022-09-23 RX ADMIN — LIDOCAINE 2 PATCH: 50 PATCH CUTANEOUS at 02:09

## 2022-09-23 RX ADMIN — KETOROLAC TROMETHAMINE 15 MG: 30 INJECTION, SOLUTION INTRAMUSCULAR; INTRAVENOUS at 02:09

## 2022-09-23 NOTE — ED TRIAGE NOTES
"Pt c/o 8/10 knee pain in both knees that started weeks ago but has progressively gotten worse over the last couple days. Pt states "I have runners knee in my left knee." Pt states she has almost fallen at work due to the pain. Pt denies taking anything for pain. Pt reports attempting to elevate legs during the night with some relief. Pt states pain is worst when knees are bent. Denies CP, SOB, dizziness, N/V/D.   "

## 2022-09-23 NOTE — ED PROVIDER NOTES
Encounter Date: 9/23/2022       History     Chief Complaint   Patient presents with    Knee Pain     Bilateral knee pain x2 weeks. States hard to get in and out of bed and states having a hard time standing up for long periods of time. Denies injury.     22-year-old female with history of PCOS, hypertension, depression, presents to the ED for bilateral knee pain.  Patient reports that her pain is has been gradually onset since last week, the pain has been come significantly worse for the last 2 days interfere with her daily function, difficult to get in and out of bed.  She tried ibuprofen with minimal relief. Patient reports to have chronic knee pain before, from runner's knee, she lost follow-up with her orthopedic surgeon due to bad experience. States that she had steroid knee injection at an urgent care before which helped with her pain.  Patient denies any injury to her knees.  No fever, chills, nausea/vomiting.      Review of patient's allergies indicates:   Allergen Reactions    Grass pollen-sophia grass standard      Past Medical History:   Diagnosis Date    ADHD (attention deficit hyperactivity disorder)     Anxiety     Asthma     as a child    Bronchitis     Depression     Hypersomnia     PCOS (polycystic ovarian syndrome)     Runner's knee, left     long time since she saw someone for this    Uterine cyst     Uterine fibroid      Past Surgical History:   Procedure Laterality Date    ADENOIDECTOMY      TONSILLECTOMY       Family History   Problem Relation Age of Onset    Hypertension Mother     Dislocations Mother     Diabetes Mother     Obesity Mother     Depression Mother     Cancer Maternal Grandmother         Skin, lung, and breast    Diabetes Maternal Grandmother     Dislocations Maternal Grandmother     Breast cancer Maternal Grandmother     Obesity Maternal Grandmother     Skin cancer Maternal Grandmother     Hypertension Father     Heart disease Father     Skin cancer Father     Endometriosis Sister      Heart disease Paternal Grandfather     Colon cancer Neg Hx     Ovarian cancer Neg Hx     Esophageal cancer Neg Hx      Social History     Tobacco Use    Smoking status: Every Day     Packs/day: 0.50     Years: 4.00     Pack years: 2.00     Types: Cigarettes    Smokeless tobacco: Never   Substance Use Topics    Alcohol use: No    Drug use: Yes     Types: Marijuana     Review of Systems   Constitutional:  Negative for chills and fever.   HENT:  Negative for congestion and sore throat.    Respiratory:  Negative for cough and shortness of breath.    Cardiovascular:  Negative for chest pain and palpitations.   Gastrointestinal:  Negative for abdominal pain, nausea and vomiting.   Genitourinary:  Negative for dysuria and flank pain.   Musculoskeletal:  Positive for arthralgias (bilateral knees). Negative for back pain and neck pain.   Skin:  Negative for rash.   Neurological:  Negative for weakness and headaches.     Physical Exam     Initial Vitals [09/23/22 0125]   BP Pulse Resp Temp SpO2   (!) 142/82 85 18 98.5 °F (36.9 °C) 99 %      MAP       --         Physical Exam    Nursing note and vitals reviewed.  Constitutional: She appears well-developed. No distress.   HENT:   Head: Normocephalic and atraumatic.   Mouth/Throat: Oropharynx is clear and moist.   Eyes: Conjunctivae and EOM are normal.   Neck: No JVD present.   Normal range of motion.  Cardiovascular:  Normal rate, regular rhythm, normal heart sounds and intact distal pulses.           Pulmonary/Chest: Breath sounds normal. No respiratory distress.   Abdominal: Abdomen is soft. She exhibits no distension. There is no abdominal tenderness.   Musculoskeletal:         General: Normal range of motion.      Cervical back: Normal range of motion.      Comments: Bilaterally knee with mild tenderness to palpation over superior patella. No pain with valgus or varus movement.   Patient is able to walk without assistant. Audible clicking sounds with ambulatory       Neurological: She is alert and oriented to person, place, and time. She has normal strength.   Skin: Skin is warm and dry. Capillary refill takes less than 2 seconds.       ED Course   Procedures  Labs Reviewed   HIV 1 / 2 ANTIBODY   HEPATITIS C ANTIBODY          Imaging Results    None          Medications   LIDOcaine 5 % patch 2 patch (2 patches Transdermal Patch Applied 9/23/22 0235)   ketorolac injection 15 mg (15 mg Intramuscular Given 9/23/22 0234)     Medical Decision Making:   Initial Assessment:   22-year-old female with history of PCOS, hypertension, depression, presents to the ED for bilateral knee pain.  Patient is well-appearing, afebrile, hemodynamically stable.  She is ambulatory without assistance, bilateral legs with doubt obvious deformity, distal neurovascularly intact  Differential Diagnosis:   Patellofemoral Pain Syndrome, arthritis, doubt ligament injury, acute fracture, gout, septic joints.   ED Management:  Patient has no history of recent injury, ambulatory without assistant, no imaging is indicated at this point.  Treated her pain with Toradol injection, lidocaine patch.  On re-examination patient reports her pain is improved.  She is stable to be discharged home, recommended knee brace, cold or warm compression, physical therapy, Tylenol and ibuprofen for pain, also prescribed lidocaine patch.  Patient referred to sports Medicine for long-term management of her knee pain.  Provided discharge instructions and return to the ED precaution, no other questions                        Clinical Impression:   Final diagnoses:  [M25.561, M25.562, G89.29] Bilateral chronic knee pain (Primary)      ED Disposition Condition    Discharge Stable          ED Prescriptions       Medication Sig Dispense Start Date End Date Auth. Provider    LIDOcaine (BLUE-EMU LIDOCAINE PATCH) 4 % PtMd Apply 1 patch topically 2 (two) times daily. for 10 days 40 patch 9/23/2022 10/3/2022 Alton Dao MD          Follow-up  Information       Follow up With Specialties Details Why Contact Info    Estefany Miller MD Sports Medicine Schedule an appointment as soon as possible for a visit   [address] [facility]  [facility] [address]  [phone]      Primary care provider  In 1 week As needed     [facility] Dept Emergency Medicine  As needed [address]  [facility] [id number] [phone]             Alton Dao MD  Resident  09/23/22 0350

## 2022-09-23 NOTE — ED NOTES
Pt held for 15 min post IM injection. Pt showed no signs of reaction. Pt denied any complaints at this time.

## 2022-09-23 NOTE — DISCHARGE INSTRUCTIONS
You can use ibuprofen or Tylenol for pain.  You also prescribed lidocaine patch, you can use it twice a day for pain.  Warm or cold compression, and knee raise will also help.  Please follow-up with your primary care provider within 1 week, you are referred to Sports Medicine to evaluate for a long-term management of your knee pain.  Return to the ED if you develop fever, your knee become increasingly swollen, warm to the touch, or unable to ambulate, or other concerns.

## 2022-09-27 ENCOUNTER — TELEPHONE (OUTPATIENT)
Dept: PAIN MEDICINE | Facility: CLINIC | Age: 22
End: 2022-09-27
Payer: COMMERCIAL

## 2022-09-27 NOTE — TELEPHONE ENCOUNTER
"This message is for patient in regards to his/her appointment 09/27/22 at 2:00p      [facility] Policy: For the safety of all patients and staff members.     Patient Visitor policy: Due to social distancing and limited seating staff are requesting patient to arrive to their schedule appointments on time.    Upon arriving to facility; patients are required to wear a face mask, if patient do not have a face mask one will be provided. Upon arriving patient we ask patients to contact clinic at this number [phone] to notify staff that they have arrived or they may do so by utilizing the [employer] in [place](if patient have patient portal; clinical staff will send a message through there letting them know it's okay to proceed to their visit). Staff will give the patient the "okay" to come up or wait inside their vehicle until clinic is ready for patient to be seen by MD Lc. If you have any questions or concerns please contact [phone]      also inquired IPM within message.    [facility] providers and Mid-levels offer interventional, procedure--based options to treat chronic pain. The goal is to manage chronic pain by reducing pain frequency and intensity and address your functional goals for activities of daily living while simultaneously reducing or eliminating your reliance on medications. Please bring any records or images that you have from prior treatments for your pain. You will be presented with multi-modal treatment plan that may or may not include imaging, interventional procedures, physical/occupational/aqua therapy, pain creams, and non-narcotic pain medications used for the treatments of chronic pain.      lvm  "

## 2022-10-03 ENCOUNTER — NURSE TRIAGE (OUTPATIENT)
Dept: ADMINISTRATIVE | Facility: CLINIC | Age: 22
End: 2022-10-03
Payer: COMMERCIAL

## 2022-10-03 NOTE — TELEPHONE ENCOUNTER
""Feels like my equilibrium is off", Pt reports dizziness on and off again for "several weeks", pt mentioned starting new medication from eye  Around the same time. Per care advice, advised to see PCP within 24 hours. Attempted to make appt for pt, unable to schedule within the timeframe. Advised pt to contact PCP office, also advised ED/UC if pt feels it is emergent. Pt verbalizes understanding.   Reason for Disposition   Taking a medicine that could cause dizziness (e.g., blood pressure medications, diuretics)    Additional Information   Negative: SEVERE difficulty breathing (e.g., struggling for each breath, speaks in single words)   Negative: [1] Difficulty breathing or swallowing AND [2] started suddenly after medicine, an allergic food or bee sting   Negative: Shock suspected (e.g., cold/pale/clammy skin, too weak to stand, low BP, rapid pulse)   Negative: Difficult to awaken or acting confused (e.g., disoriented, slurred speech)   Negative: [1] Weakness (i.e., paralysis, loss of muscle strength) of the face, arm or leg on one side of the body AND [2] sudden onset AND [3] present now   Negative: [1] Numbness (i.e., loss of sensation) of the face, arm or leg on one side of the body AND [2] sudden onset AND [3] present now   Negative: [1] Loss of speech or garbled speech AND [2] sudden onset AND [3] present now   Negative: Overdose (accidental or intentional) of medications   Negative: [1] Fainted > 15 minutes ago AND [2] still feels too weak or dizzy to stand   Negative: Heart beating < 50 beats per minute OR > 140 beats per minute   Negative: Sounds like a life-threatening emergency to the triager   Negative: Difficulty breathing   Negative: SEVERE dizziness (e.g., unable to stand, requires support to walk, feels like passing out now)   Negative: Extra heart beats OR irregular heart beating (i.e., "palpitations")   Negative: [1] Drinking very little AND [2] dehydration suspected (e.g., no urine > 12 hours, " very dry mouth, very lightheaded)   Negative: [1] Weakness (i.e., paralysis, loss of muscle strength) of the face, arm / hand, or leg / foot on one side of the body AND [2] sudden onset AND [3] brief (now gone)   Negative: [1] Numbness (i.e., loss of sensation) of the face, arm / hand, or leg / foot on one side of the body AND [2] sudden onset AND [3] brief (now gone)   Negative: [1] Loss of speech or garbled speech AND [2] sudden onset AND [3] brief (now gone)   Negative: Loss of vision or double vision (Exception: similar to previous migraines)   Negative: Patient sounds very sick or weak to the triager   Negative: [1] Dizziness caused by heat exposure, sudden standing, or poor fluid intake AND [2] no improvement after 2 hours of rest and fluids   Negative: [1] Fever > 101 F (38.3 C) AND [2] age > 60 years   Negative: [1] Fever > 103 F (39.4 C) AND [2] not able to get the fever down using Fever Care Advice   Negative: [1] Fever > 100.0 F (37.8 C) AND [2] bedridden (e.g., nursing home patient, CVA, chronic illness, recovering from surgery)   Negative: [1] Fever > 100.0 F (37.8 C) AND [2] diabetes mellitus or weak immune system (e.g., HIV positive, cancer chemo, splenectomy, organ transplant, chronic steroids)   Negative: [1] MODERATE dizziness (e.g., interferes with normal activities) AND [2] has NOT been evaluated by physician for this  (Exception: dizziness caused by heat exposure, sudden standing, or poor fluid intake)   Negative: Fever present > 3 days (72 hours)    Protocols used: Dizziness - [id number]     normal (ped)... In no apparent distress Pt at baseline

## 2022-10-05 ENCOUNTER — OFFICE VISIT (OUTPATIENT)
Dept: ORTHOPEDICS | Facility: CLINIC | Age: 22
End: 2022-10-05
Payer: COMMERCIAL

## 2022-10-05 VITALS — HEIGHT: 67 IN | BODY MASS INDEX: 45.99 KG/M2 | WEIGHT: 293 LBS

## 2022-10-05 DIAGNOSIS — M25.561 BILATERAL CHRONIC KNEE PAIN: ICD-10-CM

## 2022-10-05 DIAGNOSIS — M25.561 PATELLOFEMORAL JOINT PAIN, RIGHT: ICD-10-CM

## 2022-10-05 DIAGNOSIS — M25.562 BILATERAL CHRONIC KNEE PAIN: ICD-10-CM

## 2022-10-05 DIAGNOSIS — M22.2X2 PATELLOFEMORAL PAIN SYNDROME OF LEFT KNEE: Primary | ICD-10-CM

## 2022-10-05 DIAGNOSIS — G89.29 BILATERAL CHRONIC KNEE PAIN: ICD-10-CM

## 2022-10-05 PROCEDURE — 99213 OFFICE O/P EST LOW 20 MIN: CPT | Mod: PBBFAC | Performed by: PHYSICIAN ASSISTANT

## 2022-10-05 PROCEDURE — 99203 PR OFFICE/OUTPT VISIT, NEW, LEVL III, 30-44 MIN: ICD-10-PCS | Mod: S$GLB,,, | Performed by: PHYSICIAN ASSISTANT

## 2022-10-05 PROCEDURE — 99999 PR PBB SHADOW E&M-EST. PATIENT-LVL III: ICD-10-PCS | Mod: PBBFAC,,, | Performed by: PHYSICIAN ASSISTANT

## 2022-10-05 PROCEDURE — 99999 PR PBB SHADOW E&M-EST. PATIENT-LVL III: CPT | Mod: PBBFAC,,, | Performed by: PHYSICIAN ASSISTANT

## 2022-10-05 PROCEDURE — 99203 OFFICE O/P NEW LOW 30 MIN: CPT | Mod: S$GLB,,, | Performed by: PHYSICIAN ASSISTANT

## 2022-10-05 RX ORDER — MELOXICAM 15 MG/1
15 TABLET ORAL DAILY
Qty: 30 TABLET | Refills: 1 | Status: SHIPPED | OUTPATIENT
Start: 2022-10-05 | End: 2023-08-29

## 2022-10-05 NOTE — PROGRESS NOTES
SUBJECTIVE:     Chief Complaint   Patient presents with    Right Knee - Pain    Left Knee - Pain       History of Present Illness:  Brittaney Esposito is a 22 y.o. year old female here with complaints of constant bilateral knee pain, left greater than right which started several months ago.  There is not a history of trauma.  The pain is located in the anterior aspect of the knee.  This has been an ongoing problem for many years.  The pain is described as achy.   Aggravating factors include going up and down stairs, standing, and walking.  Associated symptoms include knee giving out, popping sensation.  Previous treatments include OTC NSAIDs and rest which have provided minimal relief.  She was seen by sports medicine last year- referred to PT but was not able to go due to her work schedule.  She has a steroid injection in her left knee almost 2 years ago with good relief.  There is not a history of previous injury or surgery to the knee.  The patient does not use an assistive device.    Review of patient's allergies indicates:   Allergen Reactions    Grass pollen-june grass standard          Current Outpatient Medications   Medication Sig Dispense Refill    etonogestreL-ethinyl estradioL (NUVARING) 0.12-0.015 mg/24 hr vaginal ring Place 1 each vaginally every 28 days. (Patient not taking: Reported on 10/5/2022) 1 each 1    meloxicam (MOBIC) 15 MG tablet Take 1 tablet (15 mg total) by mouth once daily. 30 tablet 1     Current Facility-Administered Medications   Medication Dose Route Frequency Provider Last Rate Last Admin    levonorgestreL (MIRENA) 20 mcg/24 hours (7 yrs) 52 mg IUD 1 Intra Uterine Device  1 Intra Uterine Device Intrauterine  Paola Dillon MD   1 Intra Uterine Device at 01/05/22 1115       Past Medical History:   Diagnosis Date    ADHD (attention deficit hyperactivity disorder)     Anxiety     Asthma     as a child    Bronchitis     Depression     Hypersomnia     PCOS (polycystic ovarian  "syndrome)     Runner's knee, left     long time since she saw someone for this    Uterine cyst     Uterine fibroid        Past Surgical History:   Procedure Laterality Date    ADENOIDECTOMY      TONSILLECTOMY           Review of Systems:  ROS:  Constitutional: no fever or chills  Eyes: no visual changes  ENT: no nasal congestion or sore throat  Respiratory: no cough or shortness of breath  Musculoskeletal: no arthralgias or myalgias      OBJECTIVE:     PHYSICAL EXAM:  Height: 5' 7" (170.2 cm) Weight: (!) 152.7 kg (336 lb 12 oz)   General: Well developed, well nourished, in no acute distress.  Neurological: Mood & affect are normal.  HEENT: NCAT, sclera nonicteric   Lungs: Respirations are equal and unlabored.   CV: 2+ bilateral upper and lower extremity pulses.   Skin: Intact throughout with no rashes, erythema, or lesions  Extremities: No LE edema, no erythema or warmth of the skin in either lower extremity.    left Knee Exam:  Knee Range of Motion: 0-130   Effusion: none  Condition of skin: intact  Location of tenderness: Medial retinaculum   Strength: 5 of 5 quadriceps strength and 5 of 5 hamstring strength  Stability:  stable to testing  Varus /Valgus stress:  normal    right  Knee Exam:  Knee Range of Motion:0-120   Effusion:none  Condition of skin: intact  Location of tenderness: None   Strength:5 of 5 quadriceps strength and 5 of 5 hamstring strength  Stability: stable to testing  Varus /Valgus stress: normal  IMAGING:    Previous X-rays of the bilateral knee, personally reviewed by me, demonstrate lateral patellar tilt, left greater than right.  No fracture or dislocation.     ASSESSMENT/PLAN:   22 y.o. year old female with bilateral knee patellofemoral pain    Plan: We discussed with the patient at length all the different treatment options available for the knee including NSAIDS, acetaminophen, rest, ice, knee strengthening exercise, steroid injections for temporary relief, Viscosupplementation, and knee " arthroplasty.   - Discussed activity modification, home exercises  - PT referral  - Left knee CSI today- advised that this should be done sparingly.  Can consider viscosupplementation  - Meloxicam prescription to try for 2 weeks  - Patella stabilizing brace- left knee   - Follow up if symptoms worsen or fail to improve

## 2022-10-06 ENCOUNTER — PATIENT MESSAGE (OUTPATIENT)
Dept: BARIATRICS | Facility: CLINIC | Age: 22
End: 2022-10-06
Payer: COMMERCIAL

## 2022-10-13 ENCOUNTER — PATIENT MESSAGE (OUTPATIENT)
Dept: BARIATRICS | Facility: CLINIC | Age: 22
End: 2022-10-13
Payer: COMMERCIAL

## 2023-04-06 ENCOUNTER — TELEPHONE (OUTPATIENT)
Dept: ORTHOPEDICS | Facility: CLINIC | Age: 23
End: 2023-04-06
Payer: COMMERCIAL

## 2023-04-06 DIAGNOSIS — M25.532 LEFT WRIST PAIN: Primary | ICD-10-CM

## 2023-04-06 DIAGNOSIS — M79.642 LEFT HAND PAIN: Primary | ICD-10-CM

## 2023-04-11 ENCOUNTER — HOSPITAL ENCOUNTER (OUTPATIENT)
Dept: RADIOLOGY | Facility: HOSPITAL | Age: 23
Discharge: HOME | End: 2023-04-11
Attending: PHYSICIAN ASSISTANT
Payer: COMMERCIAL

## 2023-04-11 ENCOUNTER — OFFICE VISIT (OUTPATIENT)
Dept: ORTHOPEDICS | Facility: CLINIC | Age: 23
End: 2023-04-11
Payer: COMMERCIAL

## 2023-04-11 DIAGNOSIS — M79.642 LEFT HAND PAIN: ICD-10-CM

## 2023-04-11 DIAGNOSIS — M67.432 GANGLION CYST OF DORSUM OF LEFT WRIST: Primary | ICD-10-CM

## 2023-04-11 DIAGNOSIS — M25.532 LEFT WRIST PAIN: ICD-10-CM

## 2023-04-11 PROCEDURE — 73130 XR HAND COMPLETE 3 VIEW LEFT: ICD-10-PCS | Mod: 26,LT,, | Performed by: RADIOLOGY

## 2023-04-11 PROCEDURE — 73130 X-RAY EXAM OF HAND: CPT | Mod: 26,LT,, | Performed by: RADIOLOGY

## 2023-04-11 PROCEDURE — 1159F PR MEDICATION LIST DOCUMENTED IN MEDICAL RECORD: ICD-10-PCS | Mod: CPTII,S$GLB,, | Performed by: PHYSICIAN ASSISTANT

## 2023-04-11 PROCEDURE — 99999 PR PBB SHADOW E&M-EST. PATIENT-LVL II: CPT | Mod: PBBFAC,,, | Performed by: PHYSICIAN ASSISTANT

## 2023-04-11 PROCEDURE — 73110 X-RAY EXAM OF WRIST: CPT | Mod: TC,LT

## 2023-04-11 PROCEDURE — 73110 X-RAY EXAM OF WRIST: CPT | Mod: 26,LT,, | Performed by: RADIOLOGY

## 2023-04-11 PROCEDURE — 99213 OFFICE O/P EST LOW 20 MIN: CPT | Mod: S$GLB,,, | Performed by: PHYSICIAN ASSISTANT

## 2023-04-11 PROCEDURE — 73110 XR WRIST COMPLETE 3 VIEWS LEFT: ICD-10-PCS | Mod: 26,LT,, | Performed by: RADIOLOGY

## 2023-04-11 PROCEDURE — 73130 X-RAY EXAM OF HAND: CPT | Mod: TC,LT

## 2023-04-11 PROCEDURE — 99999 PR PBB SHADOW E&M-EST. PATIENT-LVL II: ICD-10-PCS | Mod: PBBFAC,,, | Performed by: PHYSICIAN ASSISTANT

## 2023-04-11 PROCEDURE — 1159F MED LIST DOCD IN RCRD: CPT | Mod: CPTII,S$GLB,, | Performed by: PHYSICIAN ASSISTANT

## 2023-04-11 PROCEDURE — 99213 PR OFFICE/OUTPT VISIT, EST, LEVL III, 20-29 MIN: ICD-10-PCS | Mod: S$GLB,,, | Performed by: PHYSICIAN ASSISTANT

## 2023-04-11 NOTE — PROGRESS NOTES
[facility]  History & Physical  Orthopedics    SUBJECTIVE:      Chief Complaint: Left wrist mass    Referring Provider: Aj Noel Dr. is the supervising physician for this encounter/patient    History of Present Illness:  Patient is a 22 y.o. right hand dominant female who presents today with complaints of left wrist mass present for about 2-3 weeks, no trauma/injury. She just noticed this mass appeared, does have pain with end range motion of the wrist. Denies any numbness. No surgical history on the hands.     Onset of symptoms/DOI was 2-3 weeks.    Symptoms are aggravated by activity.    Symptoms are alleviated by rest.    Symptoms consist of pain.    The patient rates their pain as a 6/10.    Attempted treatment(s) and/or interventions include activity modifications, rest.     The patient denies any fevers, chills, N/V, D/C and presents for evaluation.       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       Family History   Problem Relation Age of Onset    Hypertension Mother     Dislocations Mother     Diabetes Mother     Obesity Mother     Depression Mother     Cancer Maternal Grandmother         Skin, lung, and breast    Diabetes Maternal Grandmother     Dislocations Maternal Grandmother     Breast cancer Maternal Grandmother     Obesity Maternal Grandmother     Skin cancer Maternal Grandmother     Hypertension Father      Heart disease Father     Skin cancer Father     Endometriosis Sister     Heart disease Paternal Grandfather     Colon cancer Neg Hx     Ovarian cancer Neg Hx     Esophageal cancer Neg Hx          Current Outpatient Medications:     etonogestreL-ethinyl estradioL (NUVARING) 0.12-0.015 mg/24 hr vaginal ring, Place 1 each vaginally every 28 days. (Patient not taking: Reported on 10/5/2022), Disp: 1 each, Rfl: 1    meloxicam (MOBIC) 15 MG tablet, Take 1 tablet (15 mg total) by mouth once daily., Disp: 30 tablet, Rfl: 1    Current Facility-Administered Medications:     levonorgestreL (MIRENA) 20 mcg/24 hours (7 yrs) 52 mg IUD 1 Intra Uterine Device, 1 Intra Uterine Device, Intrauterine, , Paola Dillon MD, 1 Intra Uterine Device at 01/05/22 1115      Review of Systems:  Constitutional: no fever or chills  Eyes: no visual changes  ENT: no nasal congestion or sore throat  Respiratory: no cough or shortness of breath  Cardiovascular: no chest pain  Gastrointestinal: no nausea or vomiting, tolerating diet  Musculoskeletal: pain and soreness    OBJECTIVE:      Vital Signs (Most Recent):  There were no vitals filed for this visit.  There is no height or weight on file to calculate BMI.      Physical Exam:  Constitutional: The patient appears well-developed and well-nourished. No distress.   Skin: No lesions appreciated  Head: Normocephalic and atraumatic.   Nose: Nose normal.   Ears: No deformities seen  Eyes: Conjunctivae and EOM are normal.   Neck: No tracheal deviation present.   Cardiovascular: Normal rate and intact distal pulses.    Pulmonary/Chest: Effort normal. No respiratory distress.   Abdominal: There is no guarding.   Neurological: The patient is alert.   Psychiatric: The patient has a normal mood and affect.     Left Hand/Wrist Examination:    Observation/Inspection:  Swelling  none    Deformity  none  Discoloration  none     Scars   none    Atrophy  none  Large mass noted to the dorsal carpal region with  the wrist in flexion    HAND/WRIST EXAMINATION:  Finkelstein's Test   Neg  WHAT Test    Neg  Snuff box tenderness   Neg  Last's Test    Neg  Hook of Hamate Tenderness  Neg  CMC grind    Neg  Circumduction test   Neg  The mass is firmer in nature and slightly TTP, does not appear to be encapsulated    Neurovascular Exam:  Digits WWP, brisk CR < 3s throughout  NVI motor/LTS to M/R/U nerves, radial pulse 2+  Tinel's Test - Carpal Tunnel  Neg  Tinel's Test - Cubital Tunnel  Neg  Phalen's Test    Neg  Median Nerve Compression Test Neg    ROM hand full, painless    ROM wrist full, painless    ROM elbow full, painless    Abdomen not guarded  Respirations nonlabored  Perfusion intact    Diagnostic Results:     Imaging - I independently viewed the patient's imaging as well as the radiology report.  Xrays of the patient's left hand and wrist  demonstrate no evidence of any acute fractures or dislocations or significant degenerative changes.    EMG - none    ASSESSMENT/PLAN:      22 y.o. yo female with Left dorsal wrist mass: differential is ganglion vs extensor tenosynovitis    Plan: The patient and I had a thorough discussion today.  We discussed the working diagnosis as well as several other potential alternative diagnoses.  Treatment options were discussed, both conservative and surgical.  Conservative treatment options would include things such as activity modifications, workplace modifications, a period of rest, oral vs topical OTC and prescription anti-inflammatory medications, occupational therapy, splinting/bracing, immobilization, corticosteroid injections, and others.  Surgical options were discussed as well.     At this time, the patient would like to proceed with a trial of warm compresses, wrist compression and NSAIDs. If no improvement, she will message me for wrist MRI prior to performing any steroid injections or surgical intervention.    Should the patient's symptoms worsen, persist, or fail to improve they  should return for reevaluation and I would be happy to see them back anytime.           Please do not hesitate to reach out to us via email, phone, or MyChart with any questions, concerns, or feedback.

## 2023-06-15 ENCOUNTER — PATIENT MESSAGE (OUTPATIENT)
Dept: ORTHOPEDICS | Facility: CLINIC | Age: 23
End: 2023-06-15
Payer: COMMERCIAL

## 2023-06-16 DIAGNOSIS — R22.32 MASS OF LEFT WRIST: Primary | ICD-10-CM

## 2023-07-17 ENCOUNTER — HOSPITAL ENCOUNTER (OUTPATIENT)
Dept: RADIOLOGY | Facility: HOSPITAL | Age: 23
Discharge: HOME | End: 2023-07-17
Attending: PHYSICIAN ASSISTANT
Payer: COMMERCIAL

## 2023-07-17 DIAGNOSIS — R22.32 MASS OF LEFT WRIST: ICD-10-CM

## 2023-07-17 PROCEDURE — A9585 GADOBUTROL INJECTION: HCPCS | Performed by: PHYSICIAN ASSISTANT

## 2023-07-17 PROCEDURE — 73223 MRI JOINT UPR EXTR W/O&W/DYE: CPT | Mod: TC,LT

## 2023-07-17 PROCEDURE — 73223 MRI WRIST W WO CONTRAST LEFT: ICD-10-PCS | Mod: 26,LT,, | Performed by: RADIOLOGY

## 2023-07-17 PROCEDURE — 73223 MRI JOINT UPR EXTR W/O&W/DYE: CPT | Mod: 26,LT,, | Performed by: RADIOLOGY

## 2023-07-17 PROCEDURE — 25500020 PHARM REV CODE 255: Performed by: PHYSICIAN ASSISTANT

## 2023-07-17 RX ORDER — GADOBUTROL 604.72 MG/ML
10 INJECTION INTRAVENOUS
Status: COMPLETED | OUTPATIENT
Start: 2023-07-17 | End: 2023-07-17

## 2023-07-17 RX ADMIN — GADOBUTROL 10 ML: 604.72 INJECTION INTRAVENOUS at 08:07

## 2023-08-01 ENCOUNTER — TELEPHONE (OUTPATIENT)
Dept: ORTHOPEDICS | Facility: CLINIC | Age: 23
End: 2023-08-01
Payer: COMMERCIAL

## 2023-08-01 NOTE — TELEPHONE ENCOUNTER
Called and spoke with the pt an scheduled her appt with Jan RICHARDSON and informed the pt. Pt verbalized and was thankful.

## 2023-08-29 ENCOUNTER — OFFICE VISIT (OUTPATIENT)
Dept: ORTHOPEDICS | Facility: CLINIC | Age: 23
End: 2023-08-29
Payer: COMMERCIAL

## 2023-08-29 VITALS — WEIGHT: 293 LBS | HEIGHT: 67 IN | BODY MASS INDEX: 45.99 KG/M2

## 2023-08-29 DIAGNOSIS — M67.432 GANGLION CYST OF DORSUM OF LEFT WRIST: Primary | ICD-10-CM

## 2023-08-29 PROCEDURE — 3008F PR BODY MASS INDEX (BMI) DOCUMENTED: ICD-10-PCS | Mod: CPTII,S$GLB,, | Performed by: PHYSICIAN ASSISTANT

## 2023-08-29 PROCEDURE — 99999 PR PBB SHADOW E&M-EST. PATIENT-LVL III: CPT | Mod: PBBFAC,,, | Performed by: PHYSICIAN ASSISTANT

## 2023-08-29 PROCEDURE — 3008F BODY MASS INDEX DOCD: CPT | Mod: CPTII,S$GLB,, | Performed by: PHYSICIAN ASSISTANT

## 2023-08-29 PROCEDURE — 99999 PR PBB SHADOW E&M-EST. PATIENT-LVL III: ICD-10-PCS | Mod: PBBFAC,,, | Performed by: PHYSICIAN ASSISTANT

## 2023-08-29 PROCEDURE — 99214 PR OFFICE/OUTPT VISIT, EST, LEVL IV, 30-39 MIN: ICD-10-PCS | Mod: S$GLB,,, | Performed by: PHYSICIAN ASSISTANT

## 2023-08-29 PROCEDURE — 99214 OFFICE O/P EST MOD 30 MIN: CPT | Mod: S$GLB,,, | Performed by: PHYSICIAN ASSISTANT

## 2023-08-29 PROCEDURE — 1159F PR MEDICATION LIST DOCUMENTED IN MEDICAL RECORD: ICD-10-PCS | Mod: CPTII,S$GLB,, | Performed by: PHYSICIAN ASSISTANT

## 2023-08-29 PROCEDURE — 1159F MED LIST DOCD IN RCRD: CPT | Mod: CPTII,S$GLB,, | Performed by: PHYSICIAN ASSISTANT

## 2023-08-29 RX ORDER — MUPIROCIN 20 MG/G
OINTMENT TOPICAL
Status: CANCELLED | OUTPATIENT
Start: 2023-08-29

## 2023-08-29 RX ORDER — CEFAZOLIN SODIUM 2 G/50ML
2 SOLUTION INTRAVENOUS
Status: CANCELLED | OUTPATIENT
Start: 2023-08-29

## 2023-08-29 NOTE — H&P (VIEW-ONLY)
[facility]  History & Physical  Orthopedics    SUBJECTIVE:      Chief Complaint: Left wrist mass    Referring Provider: No ref. provider found     Dr. Brandon is the supervising physician for this encounter/patient    History of Present Illness:  Patient is a 23 y.o. right hand dominant female who presents today with complaints of left wrist mass present for about 2-3 weeks, no trauma/injury. She just noticed this mass appeared, does have pain with end range motion of the wrist. Denies any numbness. No surgical history on the hands.     Interval History 8/29/23: the patient returns for continued treatment of her left wrist mass. MRI performed which was positive for ganglion cyst. She reports the pain has continued and in some cases become worse. She is looking to have the cyst removed.    Onset of symptoms/DOI was 5 months.    Symptoms are aggravated by activity.    Symptoms are alleviated by rest.    Symptoms consist of pain.    The patient rates their pain as a 6/10.    Attempted treatment(s) and/or interventions include activity modifications, rest.     The patient denies any fevers, chills, N/V, D/C and presents for evaluation.       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       Family History   Problem Relation Age of Onset    Hypertension Mother     Dislocations Mother     Diabetes Mother     Obesity Mother     Depression Mother     Cancer  "Maternal Grandmother         Skin, lung, and breast    Diabetes Maternal Grandmother     Dislocations Maternal Grandmother     Breast cancer Maternal Grandmother     Obesity Maternal Grandmother     Skin cancer Maternal Grandmother     Hypertension Father     Heart disease Father     Skin cancer Father     Endometriosis Sister     Heart disease Paternal Grandfather     Colon cancer Neg Hx     Ovarian cancer Neg Hx     Esophageal cancer Neg Hx        No current outpatient medications on file.    Current Facility-Administered Medications:     levonorgestreL (MIRENA) 20 mcg/24 hours (7 yrs) 52 mg IUD 1 Intra Uterine Device, 1 Intra Uterine Device, Intrauterine, , Paola Dillon MD, 1 Intra Uterine Device at 01/05/22 1115      Review of Systems:  Constitutional: no fever or chills  Eyes: no visual changes  ENT: no nasal congestion or sore throat  Respiratory: no cough or shortness of breath  Cardiovascular: no chest pain  Gastrointestinal: no nausea or vomiting, tolerating diet  Musculoskeletal: pain and soreness    OBJECTIVE:      Vital Signs (Most Recent):  Vitals:    08/29/23 1405   Weight: (!) 152.4 kg (336 lb)   Height: 5' 7" (1.702 m)     Body mass index is 52.63 kg/m².      Physical Exam:  Constitutional: The patient appears well-developed and well-nourished. No distress.   Skin: No lesions appreciated  Head: Normocephalic and atraumatic.   Nose: Nose normal.   Ears: No deformities seen  Eyes: Conjunctivae and EOM are normal.   Neck: No tracheal deviation present.   Cardiovascular: Normal rate and intact distal pulses.    Pulmonary/Chest: Effort normal. No respiratory distress.   Abdominal: There is no guarding.   Neurological: The patient is alert.   Psychiatric: The patient has a normal mood and affect.     Left Hand/Wrist Examination:    Observation/Inspection:  Swelling  none    Deformity  none  Discoloration  none     Scars   none    Atrophy  none  Large mass noted to the dorsal carpal region with " the wrist in flexion    HAND/WRIST EXAMINATION:  Finkelstein's Test   Neg  WHAT Test    Neg  Snuff box tenderness   Neg  Last's Test    Neg  Hook of Hamate Tenderness  Neg  CMC grind    Neg  Circumduction test   Neg  The mass is firmer in nature and slightly TTP, does not appear to be encapsulated    Neurovascular Exam:  Digits WWP, brisk CR < 3s throughout  NVI motor/LTS to M/R/U nerves, radial pulse 2+  Tinel's Test - Carpal Tunnel  Neg  Tinel's Test - Cubital Tunnel  Neg  Phalen's Test    Neg  Median Nerve Compression Test Neg    ROM hand full, painless    ROM wrist full, painless    ROM elbow full, painless    Abdomen not guarded  Respirations nonlabored  Perfusion intact    Diagnostic Results:     Imaging - I independently viewed the patient's imaging as well as the radiology report.  Xrays of the patient's left hand and wrist  demonstrate no evidence of any acute fractures or dislocations or significant degenerative changes.    MRI left wrist  Impression:     1.7 x 1.1 x 1.7 cm ganglion cyst dorsum of the wrist with displacement of extensor tendons as above.    EMG - none    ASSESSMENT/PLAN:      23 y.o. yo female with Left dorsal wrist ganglion cyst    Plan: The patient and I had a thorough discussion today.  We discussed the working diagnosis as well as several other potential alternative diagnoses.  Treatment options were discussed, both conservative and surgical.  Conservative treatment options would include things such as activity modifications, workplace modifications, a period of rest, oral vs topical OTC and prescription anti-inflammatory medications, occupational therapy, splinting/bracing, immobilization, corticosteroid injections, and others.  Surgical options were discussed as well.     At this time, the patient would like to proceed with surgery. She is consented for Left dorsal wrist ganglion cyst excision with Dr. Brandon on 9/22/23. Case ordered for [place].    The patient has not responded to  adequate non operative treatment at this time and/or non operative treatment is not indicated. Thus, the risks, benefits and alternatives to surgery were discussed with the patient in detail.  Specific risks include but are not limited to bleeding, infection, vessel and/or nerve damage, pain, numbness, tingling, complex regional pain syndrome, compartment syndrome, failure to return to pre-injury and/or preoperative functional status, scar sensitivity, delayed healing, inability to return to work, pulley injury, tendon injury, bowstringing, partial and/or incomplete relief of symptoms, weakness, persistence of and/or worsening of symptoms, surgical failure, osteomyelitis, amputation, loss of function, stiffness, functional debility, dysfunction, decreased  strength, need for prolonged postoperative rehabilitation, need for further surgery, deep venous thrombosis, pulmonary embolism, arthritis and death.  The patient states an understanding and wishes to proceed with surgery.   All questions were answered.  No guarantees were implied or stated.  Written informed consent was obtained.    Should the patient's symptoms worsen, persist, or fail to improve they should return for reevaluation and I would be happy to see them back anytime.           Please do not hesitate to reach out to us via email, phone, or MyChart with any questions, concerns, or feedback.

## 2023-08-29 NOTE — PROGRESS NOTES
[facility]  History & Physical  Orthopedics    SUBJECTIVE:      Chief Complaint: Left wrist mass    Referring Provider: No ref. provider found     Dr. Brandon is the supervising physician for this encounter/patient    History of Present Illness:  Patient is a 23 y.o. right hand dominant female who presents today with complaints of left wrist mass present for about 2-3 weeks, no trauma/injury. She just noticed this mass appeared, does have pain with end range motion of the wrist. Denies any numbness. No surgical history on the hands.     Interval History 8/29/23: the patient returns for continued treatment of her left wrist mass. MRI performed which was positive for ganglion cyst. She reports the pain has continued and in some cases become worse. She is looking to have the cyst removed.    Onset of symptoms/DOI was 5 months.    Symptoms are aggravated by activity.    Symptoms are alleviated by rest.    Symptoms consist of pain.    The patient rates their pain as a 6/10.    Attempted treatment(s) and/or interventions include activity modifications, rest.     The patient denies any fevers, chills, N/V, D/C and presents for evaluation.       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       Family History   Problem Relation Age of Onset    Hypertension Mother     Dislocations Mother     Diabetes Mother     Obesity Mother     Depression Mother     Cancer  "Maternal Grandmother         Skin, lung, and breast    Diabetes Maternal Grandmother     Dislocations Maternal Grandmother     Breast cancer Maternal Grandmother     Obesity Maternal Grandmother     Skin cancer Maternal Grandmother     Hypertension Father     Heart disease Father     Skin cancer Father     Endometriosis Sister     Heart disease Paternal Grandfather     Colon cancer Neg Hx     Ovarian cancer Neg Hx     Esophageal cancer Neg Hx        No current outpatient medications on file.    Current Facility-Administered Medications:     levonorgestreL (MIRENA) 20 mcg/24 hours (7 yrs) 52 mg IUD 1 Intra Uterine Device, 1 Intra Uterine Device, Intrauterine, , Paola Dillon MD, 1 Intra Uterine Device at 01/05/22 1115      Review of Systems:  Constitutional: no fever or chills  Eyes: no visual changes  ENT: no nasal congestion or sore throat  Respiratory: no cough or shortness of breath  Cardiovascular: no chest pain  Gastrointestinal: no nausea or vomiting, tolerating diet  Musculoskeletal: pain and soreness    OBJECTIVE:      Vital Signs (Most Recent):  Vitals:    08/29/23 1405   Weight: (!) 152.4 kg (336 lb)   Height: 5' 7" (1.702 m)     Body mass index is 52.63 kg/m².      Physical Exam:  Constitutional: The patient appears well-developed and well-nourished. No distress.   Skin: No lesions appreciated  Head: Normocephalic and atraumatic.   Nose: Nose normal.   Ears: No deformities seen  Eyes: Conjunctivae and EOM are normal.   Neck: No tracheal deviation present.   Cardiovascular: Normal rate and intact distal pulses.    Pulmonary/Chest: Effort normal. No respiratory distress.   Abdominal: There is no guarding.   Neurological: The patient is alert.   Psychiatric: The patient has a normal mood and affect.     Left Hand/Wrist Examination:    Observation/Inspection:  Swelling  none    Deformity  none  Discoloration  none     Scars   none    Atrophy  none  Large mass noted to the dorsal carpal region with " the wrist in flexion    HAND/WRIST EXAMINATION:  Finkelstein's Test   Neg  WHAT Test    Neg  Snuff box tenderness   Neg  Last's Test    Neg  Hook of Hamate Tenderness  Neg  CMC grind    Neg  Circumduction test   Neg  The mass is firmer in nature and slightly TTP, does not appear to be encapsulated    Neurovascular Exam:  Digits WWP, brisk CR < 3s throughout  NVI motor/LTS to M/R/U nerves, radial pulse 2+  Tinel's Test - Carpal Tunnel  Neg  Tinel's Test - Cubital Tunnel  Neg  Phalen's Test    Neg  Median Nerve Compression Test Neg    ROM hand full, painless    ROM wrist full, painless    ROM elbow full, painless    Abdomen not guarded  Respirations nonlabored  Perfusion intact    Diagnostic Results:     Imaging - I independently viewed the patient's imaging as well as the radiology report.  Xrays of the patient's left hand and wrist  demonstrate no evidence of any acute fractures or dislocations or significant degenerative changes.    MRI left wrist  Impression:     1.7 x 1.1 x 1.7 cm ganglion cyst dorsum of the wrist with displacement of extensor tendons as above.    EMG - none    ASSESSMENT/PLAN:      23 y.o. yo female with Left dorsal wrist ganglion cyst    Plan: The patient and I had a thorough discussion today.  We discussed the working diagnosis as well as several other potential alternative diagnoses.  Treatment options were discussed, both conservative and surgical.  Conservative treatment options would include things such as activity modifications, workplace modifications, a period of rest, oral vs topical OTC and prescription anti-inflammatory medications, occupational therapy, splinting/bracing, immobilization, corticosteroid injections, and others.  Surgical options were discussed as well.     At this time, the patient would like to proceed with surgery. She is consented for Left dorsal wrist ganglion cyst excision with Dr. Brandon on 9/22/23. Case ordered for [place].    The patient has not responded to  adequate non operative treatment at this time and/or non operative treatment is not indicated. Thus, the risks, benefits and alternatives to surgery were discussed with the patient in detail.  Specific risks include but are not limited to bleeding, infection, vessel and/or nerve damage, pain, numbness, tingling, complex regional pain syndrome, compartment syndrome, failure to return to pre-injury and/or preoperative functional status, scar sensitivity, delayed healing, inability to return to work, pulley injury, tendon injury, bowstringing, partial and/or incomplete relief of symptoms, weakness, persistence of and/or worsening of symptoms, surgical failure, osteomyelitis, amputation, loss of function, stiffness, functional debility, dysfunction, decreased  strength, need for prolonged postoperative rehabilitation, need for further surgery, deep venous thrombosis, pulmonary embolism, arthritis and death.  The patient states an understanding and wishes to proceed with surgery.   All questions were answered.  No guarantees were implied or stated.  Written informed consent was obtained.    Should the patient's symptoms worsen, persist, or fail to improve they should return for reevaluation and I would be happy to see them back anytime.           Please do not hesitate to reach out to us via email, phone, or MyChart with any questions, concerns, or feedback.

## 2023-09-21 ENCOUNTER — ANESTHESIA EVENT (OUTPATIENT)
Dept: SURGERY | Facility: HOSPITAL | Age: 23
End: 2023-09-21
Payer: COMMERCIAL

## 2023-09-21 ENCOUNTER — TELEPHONE (OUTPATIENT)
Dept: ORTHOPEDICS | Facility: CLINIC | Age: 23
End: 2023-09-21
Payer: COMMERCIAL

## 2023-09-21 ENCOUNTER — PATIENT MESSAGE (OUTPATIENT)
Dept: ORTHOPEDICS | Facility: CLINIC | Age: 23
End: 2023-09-21
Payer: COMMERCIAL

## 2023-09-21 NOTE — TELEPHONE ENCOUNTER
Spoke with the patient. Notified of 730 AM arrival time to [facility]  Informed of current visitor policy.  Reminded of NPO and need for transportation. Patient verbalized understanding to all.    Sonali Hills MS, OTC  Clinical Assistant to Dr. Ross Dunbar [facility]

## 2023-09-22 ENCOUNTER — ANESTHESIA (OUTPATIENT)
Dept: SURGERY | Facility: HOSPITAL | Age: 23
End: 2023-09-22
Payer: COMMERCIAL

## 2023-09-22 ENCOUNTER — HOSPITAL ENCOUNTER (OUTPATIENT)
Facility: HOSPITAL | Age: 23
Discharge: HOME | End: 2023-09-22
Attending: ORTHOPAEDIC SURGERY | Admitting: ORTHOPAEDIC SURGERY
Payer: COMMERCIAL

## 2023-09-22 VITALS
SYSTOLIC BLOOD PRESSURE: 128 MMHG | HEIGHT: 67 IN | HEART RATE: 62 BPM | OXYGEN SATURATION: 98 % | RESPIRATION RATE: 18 BRPM | DIASTOLIC BLOOD PRESSURE: 67 MMHG | TEMPERATURE: 98 F | BODY MASS INDEX: 45.99 KG/M2 | WEIGHT: 293 LBS

## 2023-09-22 DIAGNOSIS — M67.432 GANGLION CYST OF DORSUM OF LEFT WRIST: ICD-10-CM

## 2023-09-22 LAB
B-HCG UR QL: NEGATIVE
CTP QC/QA: YES

## 2023-09-22 PROCEDURE — 99900035 HC TECH TIME PER 15 MIN (STAT)

## 2023-09-22 PROCEDURE — 64415 NJX AA&/STRD BRCH PLXS IMG: CPT | Mod: 59,LT | Performed by: STUDENT IN AN ORGANIZED HEALTH CARE EDUCATION/TRAINING PROGRAM

## 2023-09-22 PROCEDURE — 36000706: Performed by: ORTHOPAEDIC SURGERY

## 2023-09-22 PROCEDURE — 71000015 HC POSTOP RECOV 1ST HR: Performed by: ORTHOPAEDIC SURGERY

## 2023-09-22 PROCEDURE — 63600175 PHARM REV CODE 636 W HCPCS: Performed by: NURSE ANESTHETIST, CERTIFIED REGISTERED

## 2023-09-22 PROCEDURE — 37000008 HC ANESTHESIA 1ST 15 MINUTES: Performed by: ORTHOPAEDIC SURGERY

## 2023-09-22 PROCEDURE — 36000707: Performed by: ORTHOPAEDIC SURGERY

## 2023-09-22 PROCEDURE — 25111 REMOVE WRIST TENDON LESION: CPT | Mod: LT,,, | Performed by: ORTHOPAEDIC SURGERY

## 2023-09-22 PROCEDURE — 64415 NJX AA&/STRD BRCH PLXS IMG: CPT | Mod: 59,LT,, | Performed by: ANESTHESIOLOGY

## 2023-09-22 PROCEDURE — 63600175 PHARM REV CODE 636 W HCPCS: Performed by: ANESTHESIOLOGY

## 2023-09-22 PROCEDURE — 64415 L SUPRACLAVICULAR SINGLE SHOT: ICD-10-PCS | Mod: 59,LT,, | Performed by: ANESTHESIOLOGY

## 2023-09-22 PROCEDURE — 25000003 PHARM REV CODE 250: Performed by: NURSE ANESTHETIST, CERTIFIED REGISTERED

## 2023-09-22 PROCEDURE — D9220A PRA ANESTHESIA: ICD-10-PCS | Mod: CRNA,,, | Performed by: NURSE ANESTHETIST, CERTIFIED REGISTERED

## 2023-09-22 PROCEDURE — 25000003 PHARM REV CODE 250: Performed by: ANESTHESIOLOGY

## 2023-09-22 PROCEDURE — 37000009 HC ANESTHESIA EA ADD 15 MINS: Performed by: ORTHOPAEDIC SURGERY

## 2023-09-22 PROCEDURE — 88304 TISSUE EXAM BY PATHOLOGIST: CPT | Mod: 26,,, | Performed by: PATHOLOGY

## 2023-09-22 PROCEDURE — D9220A PRA ANESTHESIA: Mod: ANES,,, | Performed by: ANESTHESIOLOGY

## 2023-09-22 PROCEDURE — 27201423 OPTIME MED/SURG SUP & DEVICES STERILE SUPPLY: Performed by: ORTHOPAEDIC SURGERY

## 2023-09-22 PROCEDURE — 88304 PR  SURG PATH,LEVEL III: ICD-10-PCS | Mod: 26,,, | Performed by: PATHOLOGY

## 2023-09-22 PROCEDURE — 71000033 HC RECOVERY, INTIAL HOUR: Performed by: ORTHOPAEDIC SURGERY

## 2023-09-22 PROCEDURE — 25111 PR EXCIS PRIMARY GANGLION WRIST: ICD-10-PCS | Mod: LT,,, | Performed by: ORTHOPAEDIC SURGERY

## 2023-09-22 PROCEDURE — 25000003 PHARM REV CODE 250: Performed by: PHYSICIAN ASSISTANT

## 2023-09-22 PROCEDURE — D9220A PRA ANESTHESIA: ICD-10-PCS | Mod: ANES,,, | Performed by: ANESTHESIOLOGY

## 2023-09-22 PROCEDURE — 94761 N-INVAS EAR/PLS OXIMETRY MLT: CPT

## 2023-09-22 PROCEDURE — 88304 TISSUE EXAM BY PATHOLOGIST: CPT | Performed by: PATHOLOGY

## 2023-09-22 PROCEDURE — D9220A PRA ANESTHESIA: Mod: CRNA,,, | Performed by: NURSE ANESTHETIST, CERTIFIED REGISTERED

## 2023-09-22 RX ORDER — ACETAMINOPHEN 500 MG
1000 TABLET ORAL EVERY 8 HOURS
Qty: 60 TABLET | Refills: 0 | Status: SHIPPED | OUTPATIENT
Start: 2023-09-22

## 2023-09-22 RX ORDER — TRAMADOL HYDROCHLORIDE 50 MG/1
50 TABLET ORAL EVERY 6 HOURS
Qty: 20 TABLET | Refills: 0 | Status: SHIPPED | OUTPATIENT
Start: 2023-09-22

## 2023-09-22 RX ORDER — OXYCODONE HYDROCHLORIDE 5 MG/1
5 TABLET ORAL
Status: DISCONTINUED | OUTPATIENT
Start: 2023-09-22 | End: 2023-09-22 | Stop reason: HOSPADM

## 2023-09-22 RX ORDER — PROPOFOL 10 MG/ML
VIAL (ML) INTRAVENOUS
Status: DISCONTINUED | OUTPATIENT
Start: 2023-09-22 | End: 2023-09-22

## 2023-09-22 RX ORDER — IBUPROFEN 600 MG/1
600 TABLET ORAL 3 TIMES DAILY
Qty: 30 TABLET | Refills: 0 | Status: SHIPPED | OUTPATIENT
Start: 2023-09-22

## 2023-09-22 RX ORDER — HYDROCODONE BITARTRATE AND ACETAMINOPHEN 5; 325 MG/1; MG/1
1 TABLET ORAL EVERY 4 HOURS PRN
Status: DISCONTINUED | OUTPATIENT
Start: 2023-09-22 | End: 2023-09-22 | Stop reason: HOSPADM

## 2023-09-22 RX ORDER — MIDAZOLAM HYDROCHLORIDE 1 MG/ML
.5-4 INJECTION INTRAMUSCULAR; INTRAVENOUS
Status: DISCONTINUED | OUTPATIENT
Start: 2023-09-22 | End: 2023-09-22 | Stop reason: HOSPADM

## 2023-09-22 RX ORDER — SODIUM CHLORIDE 0.9 % (FLUSH) 0.9 %
10 SYRINGE (ML) INJECTION
Status: DISCONTINUED | OUTPATIENT
Start: 2023-09-22 | End: 2023-09-22 | Stop reason: HOSPADM

## 2023-09-22 RX ORDER — HYDROMORPHONE HYDROCHLORIDE 1 MG/ML
0.2 INJECTION, SOLUTION INTRAMUSCULAR; INTRAVENOUS; SUBCUTANEOUS EVERY 5 MIN PRN
Status: DISCONTINUED | OUTPATIENT
Start: 2023-09-22 | End: 2023-09-22 | Stop reason: HOSPADM

## 2023-09-22 RX ORDER — ONDANSETRON 2 MG/ML
INJECTION INTRAMUSCULAR; INTRAVENOUS
Status: DISCONTINUED | OUTPATIENT
Start: 2023-09-22 | End: 2023-09-22

## 2023-09-22 RX ORDER — HALOPERIDOL 5 MG/ML
0.5 INJECTION INTRAMUSCULAR EVERY 10 MIN PRN
Status: DISCONTINUED | OUTPATIENT
Start: 2023-09-22 | End: 2023-09-22 | Stop reason: HOSPADM

## 2023-09-22 RX ORDER — DEXMEDETOMIDINE HYDROCHLORIDE 100 UG/ML
INJECTION, SOLUTION INTRAVENOUS
Status: DISCONTINUED | OUTPATIENT
Start: 2023-09-22 | End: 2023-09-22

## 2023-09-22 RX ORDER — DEXAMETHASONE SODIUM PHOSPHATE 4 MG/ML
INJECTION, SOLUTION INTRA-ARTICULAR; INTRALESIONAL; INTRAMUSCULAR; INTRAVENOUS; SOFT TISSUE
Status: DISCONTINUED | OUTPATIENT
Start: 2023-09-22 | End: 2023-09-22

## 2023-09-22 RX ORDER — MUPIROCIN 20 MG/G
OINTMENT TOPICAL
Status: DISCONTINUED | OUTPATIENT
Start: 2023-09-22 | End: 2023-09-22 | Stop reason: HOSPADM

## 2023-09-22 RX ORDER — ACETAMINOPHEN 500 MG
1000 TABLET ORAL ONCE
Status: COMPLETED | OUTPATIENT
Start: 2023-09-22 | End: 2023-09-22

## 2023-09-22 RX ORDER — ROPIVACAINE HYDROCHLORIDE 5 MG/ML
INJECTION, SOLUTION EPIDURAL; INFILTRATION; PERINEURAL
Status: COMPLETED | OUTPATIENT
Start: 2023-09-22 | End: 2023-09-22

## 2023-09-22 RX ORDER — CELECOXIB 200 MG/1
400 CAPSULE ORAL ONCE
Status: COMPLETED | OUTPATIENT
Start: 2023-09-22 | End: 2023-09-22

## 2023-09-22 RX ORDER — FENTANYL CITRATE 50 UG/ML
25-200 INJECTION, SOLUTION INTRAMUSCULAR; INTRAVENOUS
Status: DISCONTINUED | OUTPATIENT
Start: 2023-09-22 | End: 2023-09-22 | Stop reason: HOSPADM

## 2023-09-22 RX ORDER — PROPOFOL 10 MG/ML
VIAL (ML) INTRAVENOUS CONTINUOUS PRN
Status: DISCONTINUED | OUTPATIENT
Start: 2023-09-22 | End: 2023-09-22

## 2023-09-22 RX ORDER — LIDOCAINE HYDROCHLORIDE 20 MG/ML
INJECTION INTRAVENOUS
Status: DISCONTINUED | OUTPATIENT
Start: 2023-09-22 | End: 2023-09-22

## 2023-09-22 RX ADMIN — DEXAMETHASONE SODIUM PHOSPHATE 4 MG: 4 INJECTION, SOLUTION INTRAMUSCULAR; INTRAVENOUS at 10:09

## 2023-09-22 RX ADMIN — PROPOFOL 50 MG: 10 INJECTION, EMULSION INTRAVENOUS at 10:09

## 2023-09-22 RX ADMIN — DEXTROSE MONOHYDRATE 2 G: 50 INJECTION, SOLUTION INTRAVENOUS at 10:09

## 2023-09-22 RX ADMIN — FENTANYL CITRATE 100 MCG: 50 INJECTION, SOLUTION INTRAMUSCULAR; INTRAVENOUS at 09:09

## 2023-09-22 RX ADMIN — DEXMEDETOMIDINE 12 MCG: 100 INJECTION, SOLUTION, CONCENTRATE INTRAVENOUS at 10:09

## 2023-09-22 RX ADMIN — SODIUM CHLORIDE: 0.9 INJECTION, SOLUTION INTRAVENOUS at 09:09

## 2023-09-22 RX ADMIN — ONDANSETRON 4 MG: 2 INJECTION INTRAMUSCULAR; INTRAVENOUS at 10:09

## 2023-09-22 RX ADMIN — ROPIVACAINE HYDROCHLORIDE 30 ML: 5 INJECTION EPIDURAL; INFILTRATION; PERINEURAL at 09:09

## 2023-09-22 RX ADMIN — PROPOFOL 20 MG: 10 INJECTION, EMULSION INTRAVENOUS at 11:09

## 2023-09-22 RX ADMIN — PROPOFOL 125 MCG/KG/MIN: 10 INJECTION, EMULSION INTRAVENOUS at 10:09

## 2023-09-22 RX ADMIN — MIDAZOLAM 2 MG: 1 INJECTION INTRAMUSCULAR; INTRAVENOUS at 09:09

## 2023-09-22 RX ADMIN — ACETAMINOPHEN 1000 MG: 500 TABLET ORAL at 08:09

## 2023-09-22 RX ADMIN — CELECOXIB 400 MG: 200 CAPSULE ORAL at 08:09

## 2023-09-22 RX ADMIN — MUPIROCIN: 20 OINTMENT TOPICAL at 08:09

## 2023-09-22 RX ADMIN — SODIUM CHLORIDE, SODIUM GLUCONATE, SODIUM ACETATE, POTASSIUM CHLORIDE, MAGNESIUM CHLORIDE, SODIUM PHOSPHATE, DIBASIC, AND POTASSIUM PHOSPHATE: .53; .5; .37; .037; .03; .012; .00082 INJECTION, SOLUTION INTRAVENOUS at 10:09

## 2023-09-22 RX ADMIN — LIDOCAINE HYDROCHLORIDE 60 MG: 20 INJECTION INTRAVENOUS at 10:09

## 2023-09-22 NOTE — ANESTHESIA PROCEDURE NOTES
L supraclavicular single shot    Patient location during procedure: pre-op   Block not for primary anesthetic.  Reason for block: at surgeon's request and post-op pain management   Post-op Pain Location: left hand pain   Start time: 9/22/2023 9:00 AM  Timeout: 9/22/2023 9:00 AM   End time: 9/22/2023 9:10 AM    Staffing  Authorizing Provider: Corin Rosa MD  Performing Provider: Felicity Desai MD    Staffing  Performed by: Felicity Desai MD  Authorized by: Corin Rosa MD    Preanesthetic Checklist  Completed: patient identified, IV checked, site marked, risks and benefits discussed, surgical consent, monitors and equipment checked, pre-op evaluation and timeout performed  Peripheral Block  Patient position: supine  Prep: ChloraPrep  Patient monitoring: heart rate, cardiac monitor, continuous pulse ox, continuous capnometry and frequent blood pressure checks  Block type: supraclavicular  Laterality: left  Injection technique: single shot  Needle  Needle type: Stimuplex   Needle gauge: 22 G  Needle length: 2 in  Needle localization: anatomical landmarks and ultrasound guidance   -ultrasound image captured on disc.  Assessment  Injection assessment: negative aspiration, negative parasthesia and local visualized surrounding nerve  Paresthesia pain: none  Heart rate change: no  Slow fractionated injection: yes  Pain Tolerance: comfortable throughout block and no complaints  Medications:    Medications: ropivacaine (NAROPIN) injection 0.5% - Perineural   30 mL - 9/22/2023 9:10:00 AM    Additional Notes  VSS.  [facility] RN monitoring vitals throughout procedure.  Patient tolerated procedure well.

## 2023-09-22 NOTE — TRANSFER OF CARE
"Anesthesia Transfer of Care Note    Patient: Brittaney Esposito    Procedure(s) Performed: Procedure(s) (LRB):  EXCISION, GANGLION CYST, WRIST - LEFT dorsal wrist (Left)    Patient location: PACU    Anesthesia Type: MAC and regional    Transport from OR: Transported from OR on 6-10 L/min O2 by face mask with adequate spontaneous ventilation    Post pain: adequate analgesia    Post assessment: no apparent anesthetic complications    Post vital signs: stable    Level of consciousness: alert, awake and oriented    Nausea/Vomiting: no nausea/vomiting    Complications: none    Transfer of care protocol was followed      Last vitals:   Visit Vitals  BP (!) 112/54 (BP Location: Right arm, Patient Position: Lying)   Pulse 74   Temp 36.6 °C (97.8 °F) (Temporal)   Resp 14   Ht 5' 7" (1.702 m)   Wt (!) 152.4 kg (336 lb)   LMP 09/21/2023 (Exact Date)   SpO2 99%   Breastfeeding No   BMI 52.63 kg/m²     " Normal vision: sees adequately in most situations; can see medication labels, newsprint

## 2023-09-22 NOTE — PLAN OF CARE
Patient is AAO and VSS.  Tolerating PO and states pain is tolerable.  Dressing CDI.  Patient states they are ready for d/c.  IV removed.  Catheter tip intact.  Caregiver at bedside.  Discharge instructions reviewed and copy given to the patient and caregiver.  Questions answered.  Both verbalized understanding.  Medication delivered to bedside. no DME needed.  Sling in place to LUE. Patient wheeled to car by RN/PCT.

## 2023-09-22 NOTE — BRIEF OP NOTE
[facility] ([facility])  Brief Operative Note    Surgery Date: 9/22/2023     Surgeon(s) and Role:     * Marshall Brandon MD - Primary    Assisting Surgeon: None    Pre-op Diagnosis:  Ganglion cyst of dorsum of left wrist [M67.432]    Post-op Diagnosis:  Post-Op Diagnosis Codes:     * Ganglion cyst of dorsum of left wrist [M67.432]    Procedure(s) (LRB):  EXCISION, GANGLION CYST, WRIST - LEFT dorsal wrist (Left)    Anesthesia: Regional    Operative Findings: See op note    Estimated Blood Loss: minimal       Specimens:   Specimen (24h ago, onward)       Start     Ordered    09/22/23 1046  Specimen to Pathology, Surgery Orthopedics  Once        Comments: Pre-op Diagnosis: Ganglion cyst of dorsum of left wrist [M67.432]Procedure(s):EXCISION, GANGLION CYST, WRIST - LEFT dorsal wrist Number of specimens:1Name of specimens:1. Ganglion cyst left dorsal wrist     References:    Click here for ordering Quick Tip   Question Answer Comment   Procedure Type: Orthopedics    Which provider would you like to cc? MARSHALL BRANDON    Release to patient Immediate        09/22/23 1046                      Discharge Note    OUTCOME: Patient tolerated treatment/procedure well without complication and is now ready for discharge.    DISPOSITION: Home or Self Care    FINAL DIAGNOSIS:  <principal problem not specified>    FOLLOWUP: In clinic    DISCHARGE INSTRUCTIONS:    Discharge Procedure Orders   Diet general     Call MD for:  temperature >100.4     Call MD for:  persistent nausea and vomiting     Call MD for:  severe uncontrolled pain     Call MD for:  difficulty breathing, headache or visual disturbances     Call MD for:  redness, tenderness, or signs of infection (pain, swelling, redness, odor or green/yellow discharge around incision site)     Call MD for:  hives     Call MD for:  persistent dizziness or light-headedness     Call MD for:  extreme fatigue     Other restrictions (specify):   Order Comments: You will be discharged  "on a "multi-modal" pain regimen. This means that different medications are used to control your pain. Each medication works differently to control your pain so that your pain control is optimized.    You have been prescribed Ultram (Tramadol). You have been prescribed 20 pills. You may also take Motrin (Ibuprofen) and Tyelnol (Acetaminophen).    Norco and Percocet contain tylenol, do not take tylenol with these medications if you were prescribed them.     Do not bear weight on the operative extremity.    Leave your dressing on until your follow up appointment.     Call us if you experience: fever, chills, chest pain, shortness of breath, severe headache, pain not relieved by oral medications, increased pain and swelling at the operative site, or any other questions or concerns. We are happy to assist you at any time.     Leave dressing on - Keep it clean, dry, and intact until clinic visit       "

## 2023-09-22 NOTE — ANESTHESIA PREPROCEDURE EVALUATION
09/22/2023  Brittaney Esposito is a 23 y.o., female.    Patient Active Problem List   Diagnosis    Moderate ankle sprain    Right ankle sprain    Metatarsal stress fracture    Anxiety disorder    Depressive disorder    Impaired cognition    PCOS (polycystic ovarian syndrome)    Schizoaffective disorder    Knee pain    Chronic pain of left knee       Pre-op Assessment    I have reviewed the Patient Summary Reports.     I have reviewed the Nursing Notes. I have reviewed the NPO Status.      Review of Systems      Physical Exam  General: Well nourished    Airway:  Mallampati: II   Mouth Opening: Normal  TM Distance: Normal  Tongue: Normal  Neck ROM: Normal ROM        Anesthesia Plan  Type of Anesthesia, risks & benefits discussed:    Anesthesia Type: Gen ETT, Gen Natural Airway  Intra-op Monitoring Plan: Standard ASA Monitors  Post Op Pain Control Plan: multimodal analgesia  Induction:  IV  Airway Plan: Direct  Informed Consent: Informed consent signed with the Patient and all parties understand the risks and agree with anesthesia plan.  All questions answered.   ASA Score: 2  Day of Surgery Review of History & Physical: H&P Update referred to the surgeon/provider.    Ready For Surgery From Anesthesia Perspective.     .

## 2023-09-23 ENCOUNTER — PATIENT MESSAGE (OUTPATIENT)
Dept: ORTHOPEDICS | Facility: CLINIC | Age: 23
End: 2023-09-23
Payer: COMMERCIAL

## 2023-09-25 ENCOUNTER — TELEPHONE (OUTPATIENT)
Dept: ORTHOPEDICS | Facility: CLINIC | Age: 23
End: 2023-09-25
Payer: COMMERCIAL

## 2023-09-25 NOTE — TELEPHONE ENCOUNTER
Called multiple times and LVM to the pt about a splint change that if pt is okay to come into the clinic for a splint change as per provider's advise.Also sent a message via portal.

## 2023-09-25 NOTE — ANESTHESIA POSTPROCEDURE EVALUATION
Anesthesia Post Evaluation    Patient: Brittaney Esposito    Procedure(s) Performed: Procedure(s) (LRB):  EXCISION, GANGLION CYST, WRIST - LEFT dorsal wrist (Left)    Final Anesthesia Type: general      Patient location during evaluation: PACU  Patient participation: Yes- Able to Participate  Level of consciousness: awake and alert and oriented  Pain management: adequate  Airway patency: patent    PONV status at discharge: No PONV  Anesthetic complications: no      Cardiovascular status: blood pressure returned to baseline and hemodynamically stable  Respiratory status: unassisted  Hydration status: euvolemic  Follow-up not needed.          Vitals Value Taken Time   /65 09/22/23 1147   Temp 36.5 °C (97.7 °F) 09/22/23 1144   Pulse 60 09/22/23 1148   Resp 18 09/22/23 1144   SpO2 99 % 09/22/23 1148   Vitals shown include unvalidated device data.      Event Time   Out of Recovery 11:46:53         Pain/Ramy Score: No data recorded

## 2023-09-26 NOTE — OP NOTE
DATE OF PROCEDURE: 9/22/2023     COVID-19 attestation:  Due to the nature of this patient's condition and/or the presence of pain, debilitty, and/or dysfunction, proceeding with surgery was indicated.  Furthermore, this patient was treated during the COVID-19 pandemic.  This was discussed with the patient, they are aware of our current policies and procedures, were given the option of delaying their surgery when applicable and if acceptable, and they elect to proceed.  Delaying this patient's surgery greater than 30 days would be detrimental to their care and functional outcome and/or cause additional suffering, pain, discomfort, and/or dysfunction/debility.  This was confirmed and we thus proceeded.       SERVICE:  Orthopedic Surgery.     SURGEON:  Marshall Brandon M.D.     FIRST ASSISTANT:  MD JAMEEL Arizmendi     PREOPERATIVE DIAGNOSIS:    Left dorsal wrist ganglion     POSTOPERATIVE DIAGNOSIS:    Same     PROCEDURE(S) PERFORMED:    Excisional biopsy left dorsal wrist ganglion     TOURNIQUET TIME:  57 minutes at 250mmHg.     ESTIMATED BLOOD LOSS:  3 mL.     IMPLANTS:  None     COMPLICATIONS:  None.     PACKS AND DRAINS:  None.     PATHOLOGIC SPECIMENS:  The excised mass was sent for pathology.     ANESTHESIA:  Regional MAC     IV FLUIDS: Crystalloid.     CONDITION:  Stable.     MICROBIOLOGY: None.     Indications for Procedure:      The patient is a 24yo female who previously presented with a mass to the left dorsal wrist.  She wished to proceed with excisional biopsy The patient has not responded to adequate non operative treatment at this time and/or non operative treatment is not indicated. Thus, the risks, benefits and alternatives to surgery were discussed with the patient in detail.  Specific risks include but are not limited to bleeding, infection, vessel and/or nerve damage, pain, numbness, tingling, compartment syndrome, need for additional surgery, failure to return to pre-injury and/or preoperative functional  status, inability to return to work, scar sensitivity, delayed healing, complex regional pain syndrome, weakness, pulley injury, tendon injury, bowstringing, partial and/or incomplete relief of symptoms, persistence of and/or worsening of symptoms, hardware and/or surgical failure, prominent and/or symptomatic hardware possibly necessitating future removal, osteomyelitis, amputation, loss of function, stiffness, rotational malalignment, functional debility, dysfunction, decreased  strength, need for prolonged postoperative rehabilitation, malunion, nonunion, deep venous thrombosis, pulmonary embolism, arthritis and death.  The patient states an understanding and wishes to proceed with surgery.   All questions were answered.  No guarantees were implied or stated.  Written informed consent was obtained.     Procedure in detail:     On the date of the operative intervention, the patient was evaluated in the preoperative holding area.  With their participation the left upper extremity was marked at the operative site.   The patient was then administered regional anesthesia and taken to the operating room where they were placed on OR table.  The left upper extremity extremity was then placed on a hand table with a nonsterile tourniquet placed high on the patient's left upper arm.  The left upper extremity extremity was then prepped and draped in the usual sterile fashion and time-out was taken and confirmed by all present to confirm the correct patient site procedure and administration of preoperative antibiotics if ordered.  All were in agreement so I proceeded.  Esmarch was utilized to exsanguinate the left upper extremity and tourniquet was insufflated to 250 mm mercury.  Turned my attention towards the patient's left hand.  An approximately 4 cm transverse incision was marked out overlying the dorsal aspect of the left wrist centered at the level of the mass.  Skin incision was made sharply with a scalpel  dissection was carried down through skin subcutaneous tissues.  Cutaneous nerves were protected and retracted.  A small portion of the extensor retinaculum was divided and I was able to identify the 2nd through 4th dorsal compartment extensor tendons.  I retracted these to the side to gain access to the dorsal wrist capsule.  I localized the scapholunate interval and this grossly appeared consistent with the advanced preoperative imaging and contained a cystic component in a section of patulous capsule.  This was then sharply excised and passed off the field for pathology specimen.  This was tangentially excised from the dorsal scapholunate interval taking great care to identify and preserve the underlying scapholunate ligament which remained fully intact after excision.   This specimen was sent for pathology.  Having completed my excisional biopsy I then once again inspected the wound.  There was no evidence of residual or remaining mass.  The wound was irrigated with copious amounts of sterile saline and closed in layered fashion consisting of 4-0 Vicryl 4-0 Monocryl Dermabond glue and a volar slab splint to the left upper extremity.  Tourniquet was deflated brisk capillary refill in [place] throughout the entire left upper extremity.  The patient was then awakened from anesthesia return the post anesthesia care unit stable condition.  There were no complications as attending surgeon was present for the entire procedure.      Postop plan for the patient: The patient will be allowed immediate digital range of motion as tolerated without restrictions.  We will follow pathology along and she will return in 2 weeks for suture removal re-evaluation of the postoperative plan.     Please be aware that this note has been generated with the assistance of [employer] voice-to-text.  Please excuse any spelling or grammatical errors.

## 2023-09-29 LAB
FINAL PATHOLOGIC DIAGNOSIS: NORMAL
GROSS: NORMAL
Lab: NORMAL

## 2023-10-03 ENCOUNTER — OFFICE VISIT (OUTPATIENT)
Dept: ORTHOPEDICS | Facility: CLINIC | Age: 23
End: 2023-10-03
Payer: COMMERCIAL

## 2023-10-03 DIAGNOSIS — Z98.890 POSTOPERATIVE STATE: ICD-10-CM

## 2023-10-03 DIAGNOSIS — M67.432 GANGLION CYST OF DORSUM OF LEFT WRIST: Primary | ICD-10-CM

## 2023-10-03 PROCEDURE — 99024 PR POST-OP FOLLOW-UP VISIT: ICD-10-PCS | Mod: S$GLB,,, | Performed by: PHYSICIAN ASSISTANT

## 2023-10-03 PROCEDURE — 99999 PR PBB SHADOW E&M-EST. PATIENT-LVL II: CPT | Mod: PBBFAC,,, | Performed by: PHYSICIAN ASSISTANT

## 2023-10-03 PROCEDURE — 1159F PR MEDICATION LIST DOCUMENTED IN MEDICAL RECORD: ICD-10-PCS | Mod: CPTII,S$GLB,, | Performed by: PHYSICIAN ASSISTANT

## 2023-10-03 PROCEDURE — 99024 POSTOP FOLLOW-UP VISIT: CPT | Mod: S$GLB,,, | Performed by: PHYSICIAN ASSISTANT

## 2023-10-03 PROCEDURE — 1159F MED LIST DOCD IN RCRD: CPT | Mod: CPTII,S$GLB,, | Performed by: PHYSICIAN ASSISTANT

## 2023-10-03 PROCEDURE — 99999 PR PBB SHADOW E&M-EST. PATIENT-LVL II: ICD-10-PCS | Mod: PBBFAC,,, | Performed by: PHYSICIAN ASSISTANT

## 2023-10-03 NOTE — PROGRESS NOTES
Dr. Brandon is the supervising physician for this encounter/patient    Brittaney Esposito presents for post-operative evaluation.  The patient is now 2 weeks s/p Left dorsal wrist ganglion excision with Dr. Brandon on 9/22/23.  Overall the patient reports doing well.  She reports 1/10 pain, denies any f/c/s, no numbness. She removed her surgical splint 3 days after surgery and kept the wrist wrapped in ACE.    Pathology report consistent with benign ganglion cyst.    PE:    AA&O x 4.  NAD  HEENT:  NCAT, sclera nonicteric  Lungs:  Respirations are equal and unlabored.  CV:  2+ bilateral upper and lower extremity pulses.  MSK: The wound is healing well with no signs of erythema or warmth.  There is no drainage.  No clinical signs or symptoms of infection are present.  Full hand and wrist motion present. SILT. DNVI.    A/P: Status post above, doing well  1) Continue with brace for comfort and with weight bearing for a few weeks, then increase activity as tolerated.  2) All sutures removed today. Wound care and signs of infection discussed. Scar massage discussed.  3) F/U as needed.  4) Call with any questions/concerns in the interim      Jamie Russo-DiGeorge PA-C

## 2023-10-13 NOTE — ADDENDUM NOTE
Addendum  created 10/13/23 1111 by Corin Rosa MD    Attestation recorded in Intraprocedure, Intraprocedure Attestations filed

## 2024-02-02 ENCOUNTER — OFFICE VISIT (OUTPATIENT)
Dept: ORTHOPEDICS | Facility: CLINIC | Age: 24
End: 2024-02-02
Payer: COMMERCIAL

## 2024-02-02 ENCOUNTER — HOSPITAL ENCOUNTER (OUTPATIENT)
Dept: RADIOLOGY | Facility: HOSPITAL | Age: 24
Discharge: HOME | End: 2024-02-02
Attending: PHYSICIAN ASSISTANT
Payer: COMMERCIAL

## 2024-02-02 DIAGNOSIS — M67.432 GANGLION CYST OF DORSUM OF LEFT WRIST: ICD-10-CM

## 2024-02-02 DIAGNOSIS — M25.532 LEFT WRIST PAIN: ICD-10-CM

## 2024-02-02 DIAGNOSIS — M65.9 TENOSYNOVITIS OF WRIST FLEXOR: Primary | ICD-10-CM

## 2024-02-02 PROCEDURE — 99999 PR PBB SHADOW E&M-EST. PATIENT-LVL II: CPT | Mod: PBBFAC,,, | Performed by: PHYSICIAN ASSISTANT

## 2024-02-02 PROCEDURE — 99213 OFFICE O/P EST LOW 20 MIN: CPT | Mod: S$GLB,,, | Performed by: PHYSICIAN ASSISTANT

## 2024-02-02 PROCEDURE — 1159F MED LIST DOCD IN RCRD: CPT | Mod: CPTII,S$GLB,, | Performed by: PHYSICIAN ASSISTANT

## 2024-02-02 PROCEDURE — 73110 X-RAY EXAM OF WRIST: CPT | Mod: 26,LT,, | Performed by: RADIOLOGY

## 2024-02-02 PROCEDURE — 73110 X-RAY EXAM OF WRIST: CPT | Mod: TC,LT

## 2024-02-02 NOTE — PROGRESS NOTES
[facility]  History & Physical  Orthopedics    SUBJECTIVE:      Chief Complaint: Left wrist pain    Referring Provider: No ref. provider found     Dr. Brandon is the supervising physician for this encounter/patient    History of Present Illness:  Patient is a 23 y.o. right hand dominant female who presents today with complaints of left volar wrist pain present for about 3 months. History of left dorsal wrist ganglion excision with Dr. Brandon on 9/22/23. She reports that this volar wrist pain started about 1 month after that surgery. She has full painless wrist motion, denies any numbness/tingling. 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 Denies this pain at night.     The patient is a/an dry .    Onset of symptoms/DOI was 3 months.    Symptoms are aggravated by activity.    Symptoms are alleviated by rest.    Symptoms consist of pain.    The patient rates their pain as a 4/10.    Attempted treatment(s) and/or interventions include activity modifications, rest.     The patient denies any fevers, chills, N/V, D/C and presents for evaluation.       Past Medical History:   Diagnosis Date    ADHD (attention deficit hyperactivity disorder)     Anxiety     Asthma     as a child    Bronchitis     Depression     Hypersomnia     PCOS (polycystic ovarian syndrome)     Runner's knee, left     long time since she saw someone for this    Uterine cyst     Uterine fibroid      Past Surgical History:   Procedure Laterality Date    ADENOIDECTOMY      EXCISION OF GANGLION OF WRIST Left 9/22/2023    Procedure: EXCISION, GANGLION CYST, WRIST - LEFT dorsal wrist;  Surgeon: Marshall Brandon MD;  Location: [facility];  Service: Orthopedics;  Laterality: Left;    TONSILLECTOMY       Review of patient's allergies indicates:   Allergen Reactions    Grass pollen-sophia grass standard      Social History     Social History Narrative    [place]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       Family History   Problem Relation Age of Onset    Hypertension Mother     Dislocations Mother     Diabetes Mother     Obesity Mother     Depression Mother     Cancer Maternal Grandmother         Skin, lung, and breast    Diabetes Maternal Grandmother     Dislocations Maternal Grandmother     Breast cancer Maternal Grandmother     Obesity Maternal Grandmother     Skin cancer Maternal Grandmother     Hypertension Father     Heart disease Father     Skin cancer Father     Endometriosis Sister     Heart disease Paternal Grandfather     Colon cancer Neg Hx     Ovarian cancer Neg Hx     Esophageal cancer Neg Hx          Current Outpatient Medications:     acetaminophen (TYLENOL) 500 MG tablet, Take 2 tablets (1,000 mg total) by mouth every 8 (eight) hours. (Patient not taking: Reported on 10/3/2023), Disp: 60 tablet, Rfl: 0    ibuprofen (ADVIL,MOTRIN) 600 MG tablet, Take 1 tablet (600 mg total) by mouth 3 (three) times daily. (Patient not taking: Reported on 10/3/2023), Disp: 30 tablet, Rfl: 0    traMADoL (ULTRAM) 50 mg tablet, Take 1 tablet (50 mg total) by mouth every 6 (six) hours. (Patient not taking: Reported on 10/3/2023), Disp: 20 tablet, Rfl: 0    Current Facility-Administered Medications:     levonorgestreL (MIRENA) 20 mcg/24 hours (7 yrs) 52 mg IUD 1 Intra Uterine Device, 1 Intra Uterine Device, Intrauterine, , Paola Dillon MD, 1 Intra Uterine Device at 01/05/22 1115      Review of Systems:  Constitutional: no fever or chills  Eyes: no visual changes  ENT: no nasal congestion or sore throat  Respiratory: no cough or shortness of breath  Cardiovascular: no chest pain  Gastrointestinal: no nausea or vomiting, tolerating diet  Musculoskeletal: pain and soreness    OBJECTIVE:      Vital Signs (Most Recent):  There were no vitals filed for this visit.  There is no height or weight on file to calculate BMI.      Physical Exam:  Constitutional: The patient appears well-developed and  well-nourished. No distress.   Skin: No lesions appreciated  Head: Normocephalic and atraumatic.   Nose: Nose normal.   Ears: No deformities seen  Eyes: Conjunctivae and EOM are normal.   Neck: No tracheal deviation present.   Cardiovascular: Normal rate and intact distal pulses.    Pulmonary/Chest: Effort normal. No respiratory distress.   Abdominal: There is no guarding.   Neurological: The patient is alert.   Psychiatric: The patient has a normal mood and affect.     Left Hand/Wrist Examination:    Observation/Inspection:  Swelling  none    Deformity  none  Discoloration  none     Scars   Healed dorsal wrist    Atrophy  none    HAND/WRIST EXAMINATION:  Finkelstein's Test   Neg  WHAT Test    Neg  Snuff box tenderness   Neg  Last's Test    Neg  Hook of Hamate Tenderness  Neg  CMC grind    Neg  Circumduction test   Neg    Neurovascular Exam:  Digits WWP, brisk CR < 3s throughout  NVI motor/LTS to M/R/U nerves, radial pulse 2+  Tinel's Test - Carpal Tunnel  Pain to volar wrist  Tinel's Test - Cubital Tunnel  Neg  Phalen's Test    Neg  Median Nerve Compression Test Neg    ROM hand full, painless    ROM wrist full, painless    ROM elbow full, painless    Abdomen not guarded  Respirations nonlabored  Perfusion intact    Diagnostic Results:     Imaging - I independently viewed the patient's imaging as well as the radiology report.  Xrays of the patient's left wrist  demonstrate no evidence of any acute fractures or dislocations or significant degenerative changes.    EMG - none    ASSESSMENT/PLAN:      23 y.o. yo female with Left volar wrist pain, flexor tenosynovitis vs CTS    Plan: The patient and I had a thorough discussion today.  We discussed the working diagnosis as well as several other potential alternative diagnoses.  Treatment options were discussed, both conservative and surgical.  Conservative treatment options would include things such as activity modifications, workplace modifications, a period of rest,  oral vs topical OTC and prescription anti-inflammatory medications, occupational therapy, splinting/bracing, immobilization, corticosteroid injections, and others.  Surgical options were discussed as well.     At this time, the patient would like to proceed with a trial of CT brace given today for use while at work. If no improvement, we can order MRI for further evaluation. RTC on prn basis.    Should the patient's symptoms worsen, persist, or fail to improve they should return for reevaluation and I would be happy to see them back anytime.           Please do not hesitate to reach out to us via email, phone, or MyChart with any questions, concerns, or feedback.

## 2025-06-06 NOTE — TELEPHONE ENCOUNTER
Phoned number listed for patient and the recording stated that we have reached Jan Cate and to leave a message.  Left message to have patient RC to discuss the bariatric workup.   done

## 2025-06-21 NOTE — PROCEDURES
Removal of IUD    Date/Time: 7/12/2022 1:00 PM  Performed by: Paola Dillon MD  Authorized by: Paola Dillon MD     Consent obtained:  Verbal  Consent given by:  Patient  Procedure risks and benefits discussed: yes    Patient questions answered: yes    Patient agrees, verbalizes understanding, and wants to proceed: yes    IUD grasped by: forceps  Removal due to infection and inflammatory reaction: no    Removal due to mechanical complications of IUD/Nexplanon: yes (IUD found in the cervical canal)    Removed with no complications: yes      Paola Dillon     PAST SURGICAL HISTORY:  No significant past surgical history

## (undated) DEVICE — DRAPE U SPLIT SHEET 54X76IN

## (undated) DEVICE — Device

## (undated) DEVICE — GAUZE SPONGE 4X4 12PLY

## (undated) DEVICE — PAD CAST 2 IN X 4YDS STERILE

## (undated) DEVICE — SLING ARM LARGE FOAM STRAP

## (undated) DEVICE — DRESSING XEROFORM NONADH 1X8IN

## (undated) DEVICE — PAD CAST SPECIALIST STRL 4

## (undated) DEVICE — TOURNIQUET SB QC DP 18X4IN

## (undated) DEVICE — GLOVE PI ULTRA TOUCH G SURGEON

## (undated) DEVICE — ADHESIVE DERMABOND ADVANCED

## (undated) DEVICE — PAD PREP CUFFED NS 24X48IN

## (undated) DEVICE — DRAPE STERI-DRAPE 1000 17X11IN

## (undated) DEVICE — BANDAGE MATRIX HK LOOP 2IN 5YD

## (undated) DEVICE — SPLINT PLASTER F.S 4INX15IN

## (undated) DEVICE — SOL IRR SOD CHL .9% POUR

## (undated) DEVICE — UNDERGLOVES BIOGEL PI SIZE 8

## (undated) DEVICE — SUT VICRYL 4-0 RB1 27IN UD

## (undated) DEVICE — BANDAGE MATRIX HK LOOP 4IN 5YD